# Patient Record
Sex: MALE | Race: WHITE | Employment: OTHER | URBAN - METROPOLITAN AREA
[De-identification: names, ages, dates, MRNs, and addresses within clinical notes are randomized per-mention and may not be internally consistent; named-entity substitution may affect disease eponyms.]

---

## 2017-03-22 DIAGNOSIS — I10 ESSENTIAL HYPERTENSION: ICD-10-CM

## 2017-03-22 RX ORDER — VALSARTAN AND HYDROCHLOROTHIAZIDE 320; 25 MG/1; MG/1
TABLET, FILM COATED ORAL
Qty: 90 TAB | Refills: 0 | Status: SHIPPED | OUTPATIENT
Start: 2017-03-22 | End: 2017-08-24 | Stop reason: SDUPTHER

## 2017-04-17 ENCOUNTER — HOSPITAL ENCOUNTER (OUTPATIENT)
Dept: LAB | Age: 67
Discharge: HOME | End: 2017-04-17
Payer: MEDICARE

## 2017-04-17 ENCOUNTER — OFFICE VISIT (OUTPATIENT)
Dept: INTERNAL MEDICINE CLINIC | Age: 67
End: 2017-04-17

## 2017-04-17 VITALS
DIASTOLIC BLOOD PRESSURE: 80 MMHG | BODY MASS INDEX: 34.08 KG/M2 | OXYGEN SATURATION: 94 % | RESPIRATION RATE: 16 BRPM | HEART RATE: 55 BPM | TEMPERATURE: 98.1 F | SYSTOLIC BLOOD PRESSURE: 140 MMHG | HEIGHT: 74 IN | WEIGHT: 265.6 LBS

## 2017-04-17 DIAGNOSIS — I10 ESSENTIAL HYPERTENSION: ICD-10-CM

## 2017-04-17 DIAGNOSIS — M17.11 PRIMARY OSTEOARTHRITIS OF RIGHT KNEE: ICD-10-CM

## 2017-04-17 DIAGNOSIS — N52.9 ERECTILE DYSFUNCTION, UNSPECIFIED ERECTILE DYSFUNCTION TYPE: ICD-10-CM

## 2017-04-17 DIAGNOSIS — Z23 NEED FOR 23-POLYVALENT PNEUMOCOCCAL POLYSACCHARIDE VACCINE: ICD-10-CM

## 2017-04-17 DIAGNOSIS — Z13.39 SCREENING FOR ALCOHOLISM: ICD-10-CM

## 2017-04-17 DIAGNOSIS — Z00.00 MEDICARE ANNUAL WELLNESS VISIT, SUBSEQUENT: ICD-10-CM

## 2017-04-17 DIAGNOSIS — Z00.00 MEDICARE ANNUAL WELLNESS VISIT, SUBSEQUENT: Primary | ICD-10-CM

## 2017-04-17 DIAGNOSIS — Z00.00 ROUTINE GENERAL MEDICAL EXAMINATION AT A HEALTH CARE FACILITY: ICD-10-CM

## 2017-04-17 DIAGNOSIS — E78.5 DYSLIPIDEMIA: ICD-10-CM

## 2017-04-17 DIAGNOSIS — E66.01 MORBID OBESITY DUE TO EXCESS CALORIES (HCC): ICD-10-CM

## 2017-04-17 PROCEDURE — 80053 COMPREHEN METABOLIC PANEL: CPT

## 2017-04-17 PROCEDURE — 84443 ASSAY THYROID STIM HORMONE: CPT

## 2017-04-17 PROCEDURE — 80061 LIPID PANEL: CPT

## 2017-04-17 PROCEDURE — 85025 COMPLETE CBC W/AUTO DIFF WBC: CPT

## 2017-04-17 PROCEDURE — 84153 ASSAY OF PSA TOTAL: CPT

## 2017-04-17 PROCEDURE — 36415 COLL VENOUS BLD VENIPUNCTURE: CPT

## 2017-04-17 RX ORDER — SILDENAFIL 100 MG/1
100 TABLET, FILM COATED ORAL AS NEEDED
Qty: 6 TAB | Refills: 11 | Status: SHIPPED | OUTPATIENT
Start: 2017-04-17 | End: 2017-05-22 | Stop reason: SDUPTHER

## 2017-04-17 NOTE — MR AVS SNAPSHOT
Visit Information Date & Time Provider Department Dept. Phone Encounter #  
 4/17/2017  8:30 AM Fozia Hare MD [facility] [phone] [id number] Follow-up Instructions Return in about 1 year (around 4/17/2018). Upcoming Health Maintenance Date Due Hepatitis C Screening 1950 FOBT Q 1 YEAR AGE 50-75 8/15/2000 GLAUCOMA SCREENING Q2Y 8/15/2015 Pneumococcal 65+ High/Highest Risk (2 of 2 - PPSV23) 6/9/2016 MEDICARE YEARLY EXAM 4/6/2017 DTaP/Tdap/Td series (2 - Td) 4/14/2026 Allergies as of 4/17/2017  Review Complete On: 4/17/2017 By: Myla Jacques No Known Allergies Current Immunizations  Reviewed on 10/14/2016 Name Date Influenza High Dose Vaccine PF 10/11/2016 Pneumococcal Conjugate (PCV-13) 4/14/2016 Tdap 4/14/2016 Zoster Vaccine, Live 6/1/2014 Not reviewed this visit You Were Diagnosed With   
  
 Codes Comments Medicare annual wellness visit, subsequent    -  Primary ICD-10-CM: Z00.00 ICD-9-CM: V70.0 Essential hypertension     ICD-10-CM: I10 
ICD-9-CM: 401.9 Morbid obesity due to excess calories (HCC)     ICD-10-CM: E66.01 
ICD-9-CM: 278.01 Dyslipidemia     ICD-10-CM: E78.5 ICD-9-CM: 272.4 Need for 23-polyvalent pneumococcal polysaccharide vaccine     ICD-10-CM: K86 ICD-9-CM: V03.82 Post-procedural erectile dysfunction, unspecified type     ICD-10-CM: N52.39 ICD-9-CM: 607.84 Vitals BP Pulse Temp Resp Height(growth percentile) Weight(growth percentile) 142/87 (BP 1 Location: Left arm, BP Patient Position: At rest) (!) 55 98.1 °F (36.7 °C) (Oral) 16 6' 2\" (1.88 m) 265 lb 9.6 oz (120.5 kg) SpO2 BMI Smoking Status 94% 34.1 kg/m2 Former Smoker BMI and BSA Data Body Mass Index Body Surface Area  
 34.1 kg/m 2 2.51 m 2 Preferred Pharmacy Pharmacy Name Phone [facility], [address] [phone] Your Updated Medication List  
  
   
This list is accurate as of: 4/17/17  8:58 AM.  Always use your most recent med list.  
  
  
  
  
 ibuprofen 200 mg tablet Commonly known as:  MOTRIN Take  by mouth. Omega-3-DHA-EPA-Fish Oil 1,000 mg (120 mg-180 mg) Cap Take 1 Cap by mouth daily. sildenafil citrate 100 mg tablet Commonly known as:  VIAGRA Take 1 Tab by mouth as needed. valsartan-hydroCHLOROthiazide 320-25 mg per tablet Commonly known as:  DIOVAN-HCT  
TAKE 1 TABLET BY MOUTH DAILY Prescriptions Sent to Pharmacy Refills  
 sildenafil citrate (VIAGRA) 100 mg tablet 11 Sig: Take 1 Tab by mouth as needed. Class: Normal  
 Pharmacy: [facility], [address] #: [phone] Route: Oral  
  
We Performed the Following CBC WITH AUTOMATED DIFF [63166 CPT(R)] LIPID PANEL [80582 CPT(R)] METABOLIC PANEL, COMPREHENSIVE [82537 CPT(R)] PSA W/ REFLX FREE PSA [51196 CPT(R)] Follow-up Instructions Return in about 1 year (around 4/17/2018). To-Do List   
 04/17/2017 Lab:  TSH 3RD GENERATION [facility]! Dear Rica Vee: Thank you for requesting a [employer] account. Our records indicate that you already have an active [employer] account. You can access your account anytime at [email or web address] Did you know that you can access your hospital and ER discharge instructions at any time in [employer]? You can also review all of your test results from your hospital stay or ER visit. Additional Information If you have questions, please visit the Frequently Asked Questions section of the [employer] website at [email or web address] Remember, [employer] is NOT to be used for urgent needs.  For medical emergencies, dial 911. Now available from your iPhone and Android! Please provide this summary of care documentation to your next provider. Your primary care clinician is listed as [id number] [address] [place] [address] [address]. If you have any questions after today's visit, please call [phone].

## 2017-04-17 NOTE — PROGRESS NOTES
This is a Subsequent Medicare Annual Wellness Visit providing Personalized Prevention Plan Services (PPPS) (Performed 12 months after initial AWV and PPPS )    I have reviewed the patient's medical history in detail and updated the computerized patient record. History   Here for mwv. He is in good health . He has controlled htn. He has djd of his knee but is active. He is a skier. He has some mild ed now. He needs vaccines and labs. He is up to date with c-scopes. He has had no luck dieting for weight loss. Past Medical History:   Diagnosis Date    Aortic valve sclerosis     HLD (hyperlipidemia)     Hypertension     Kidney stone     Obesity       Past Surgical History:   Procedure Laterality Date    HX COLONOSCOPY  2014    HX LITHOTRIPSY      HX PROSTATECTOMY      HX TONSILLECTOMY       Current Outpatient Prescriptions   Medication Sig Dispense Refill    sildenafil citrate (VIAGRA) 100 mg tablet Take 1 Tab by mouth as needed. 6 Tab 11    valsartan-hydroCHLOROthiazide (DIOVAN-HCT) 320-25 mg per tablet TAKE 1 TABLET BY MOUTH DAILY 90 Tab 0    ibuprofen (MOTRIN) 200 mg tablet Take  by mouth.  Omega-3-DHA-EPA-Fish Oil 1,000 (120-180) mg Cap Take 1 Cap by mouth daily.          No Known Allergies  Family History   Problem Relation Age of Onset    Hypertension Mother     Stroke Mother     Cancer Mother      breast    Hypertension Father     Heart Disease Father      valvular     Social History   Substance Use Topics    Smoking status: Former Smoker    Smokeless tobacco: Never Used    Alcohol use 1.0 oz/week     2 Cans of beer per week      Comment: 2 beers a couple nights a week     Patient Active Problem List   Diagnosis Code    Pneumonia, organism unspecified J18.9    Fever, unspecified R50.9    Arthritis M19.90    Syncope and collapse R55    Leukopenia D72.819    ARF (acute renal failure) ([facility] [address] [facility] N17.9    Obesity E66.9    Aortic valve sclerosis I35.8    Dyslipidemia E78.5    Fatigue due to excessive exertion T73. 3XXA    Midline low back pain without sciatica M54.5    Primary osteoarthritis of right knee M17.11    Essential hypertension I10       Depression Risk Factor Screening:     PHQ 2 / 9, over the last two weeks 4/17/2017   Little interest or pleasure in doing things Not at all   Feeling down, depressed or hopeless Not at all   Total Score PHQ 2 0     Alcohol Risk Factor Screening: On any occasion during the past 3 months, have you had more than 4 drinks containing alcohol? No    Do you average more than 14 drinks per week? No      Functional Ability and Level of Safety:     Hearing Loss   none    Activities of Daily Living   Self-care. Requires assistance with: no ADLs    Fall Risk     Fall Risk Assessment, last 12 mths 4/17/2017   Able to walk? Yes   Fall in past 12 months? No     Abuse Screen   Patient is not abused    Review of Systems   A comprehensive review of systems was negative except for that written in the HPI.     Physical Examination     Evaluation of Cognitive Function:  Mood/affect:  happy  Appearance: age appropriate  Family member/caregiver input: none    Visit Vitals    /80 (BP 1 Location: Left arm, BP Patient Position: At rest)    Pulse (!) 55    Temp 98.1 °F (36.7 °C) (Oral)    Resp 16    Ht 6' 2\" (1.88 m)    Wt 265 lb 9.6 oz (120.5 kg)    SpO2 94%    BMI 34.1 kg/m2     General appearance: alert, cooperative, no distress, appears stated age  Lungs: clear to auscultation bilaterally  Heart: regular rate and rhythm, S1, S2 normal, no murmur, click, rub or gallop    Patient Care Team:  Rodrick Hooker MD as PCP - General (Internal Medicine)  Valentine Gary MD as Physician (Cardiology)  María Odonnell MD as Physician (Sleep Medicine)    Advice/Referrals/Counseling   Education and counseling provided:  Are appropriate based on today's review and evaluation      Assessment/Plan   Сергей Vallejo was seen today for complete physical.    Diagnoses and all orders for this visit:    Medicare annual wellness visit, subsequent  -     CBC WITH AUTOMATED DIFF  -     PSA W/ REFLX FREE PSA  -     METABOLIC PANEL, COMPREHENSIVE  -     LIPID PANEL  -     TSH 3RD GENERATION; Future    Essential hypertension  The current medical regimen is effective;  continue present plan and medications. Morbid obesity due to excess calories ([address]    Dyslipidemia    Need for 23-polyvalent pneumococcal polysaccharide vaccine  Rx given. Primary osteoarthritis of right knee    Erectile dysfunction, unspecified type  -     sildenafil citrate (VIAGRA) 100 mg tablet; Take 1 Tab by mouth as needed. Tammy Rosenthal

## 2017-04-17 NOTE — PROGRESS NOTES
1. Have you been to the ER, urgent care clinic since your last visit? Hospitalized since your last visit?no    2. Have you seen or consulted any other health care providers outside of the [address] [place] [address] since your last visit? Include any pap smears or colon screening.  No  Chief Complaint   Patient presents with    Complete Physical     Fasting

## 2017-04-18 LAB
ALBUMIN SERPL-MCNC: 4.4 G/DL (ref 3.6–4.8)
ALBUMIN/GLOB SERPL: 1.3 {RATIO} (ref 1.2–2.2)
ALP SERPL-CCNC: 68 IU/L (ref 39–117)
ALT SERPL-CCNC: 20 IU/L (ref 0–44)
AST SERPL-CCNC: 27 IU/L (ref 0–40)
BASOPHILS # BLD AUTO: 0 X10E3/UL (ref 0–0.2)
BASOPHILS NFR BLD AUTO: 0 %
BILIRUB SERPL-MCNC: 0.7 MG/DL (ref 0–1.2)
BUN SERPL-MCNC: 23 MG/DL (ref 8–27)
BUN/CREAT SERPL: 20 (ref 10–24)
CALCIUM SERPL-MCNC: 9.5 MG/DL (ref 8.6–10.2)
CHLORIDE SERPL-SCNC: 100 MMOL/L (ref 96–106)
CHOLEST SERPL-MCNC: 199 MG/DL (ref 100–199)
CO2 SERPL-SCNC: 26 MMOL/L (ref 18–29)
CREAT SERPL-MCNC: 1.13 MG/DL (ref 0.76–1.27)
EOSINOPHIL # BLD AUTO: 0.2 X10E3/UL (ref 0–0.4)
EOSINOPHIL NFR BLD AUTO: 4 %
ERYTHROCYTE [DISTWIDTH] IN BLOOD BY AUTOMATED COUNT: 13.2 % (ref 12.3–15.4)
GLOBULIN SER CALC-MCNC: 3.5 G/DL (ref 1.5–4.5)
GLUCOSE SERPL-MCNC: 99 MG/DL (ref 65–99)
HCT VFR BLD AUTO: 45.1 % (ref 37.5–51)
HDLC SERPL-MCNC: 47 MG/DL
HGB BLD-MCNC: 15.3 G/DL (ref 12.6–17.7)
IMM GRANULOCYTES # BLD: 0 X10E3/UL (ref 0–0.1)
IMM GRANULOCYTES NFR BLD: 0 %
INTERPRETATION, 910389: NORMAL
LDLC SERPL CALC-MCNC: 121 MG/DL (ref 0–99)
LYMPHOCYTES # BLD AUTO: 2 X10E3/UL (ref 0.7–3.1)
LYMPHOCYTES NFR BLD AUTO: 38 %
MCH RBC QN AUTO: 32.4 PG (ref 26.6–33)
MCHC RBC AUTO-ENTMCNC: 33.9 G/DL (ref 31.5–35.7)
MCV RBC AUTO: 96 FL (ref 79–97)
MONOCYTES # BLD AUTO: 0.5 X10E3/UL (ref 0.1–0.9)
MONOCYTES NFR BLD AUTO: 10 %
NEUTROPHILS # BLD AUTO: 2.6 X10E3/UL (ref 1.4–7)
NEUTROPHILS NFR BLD AUTO: 48 %
PLATELET # BLD AUTO: 237 X10E3/UL (ref 150–379)
POTASSIUM SERPL-SCNC: 4.6 MMOL/L (ref 3.5–5.2)
PROT SERPL-MCNC: 7.9 G/DL (ref 6–8.5)
PSA SERPL-MCNC: 1.3 NG/ML (ref 0–4)
RBC # BLD AUTO: 4.72 X10E6/UL (ref 4.14–5.8)
REFLEX CRITERIA: NORMAL
SODIUM SERPL-SCNC: 142 MMOL/L (ref 134–144)
TRIGL SERPL-MCNC: 153 MG/DL (ref 0–149)
TSH SERPL DL<=0.005 MIU/L-ACNC: 1.76 UIU/ML (ref 0.45–4.5)
VLDLC SERPL CALC-MCNC: 31 MG/DL (ref 5–40)
WBC # BLD AUTO: 5.4 X10E3/UL (ref 3.4–10.8)

## 2017-05-22 ENCOUNTER — PATIENT MESSAGE (OUTPATIENT)
Dept: INTERNAL MEDICINE CLINIC | Age: 67
End: 2017-05-22

## 2017-05-22 DIAGNOSIS — N52.9 ERECTILE DYSFUNCTION, UNSPECIFIED ERECTILE DYSFUNCTION TYPE: ICD-10-CM

## 2017-05-22 RX ORDER — SILDENAFIL 100 MG/1
100 TABLET, FILM COATED ORAL AS NEEDED
Qty: 6 TAB | Refills: 11 | Status: SHIPPED | COMMUNITY
Start: 2017-05-22 | End: 2017-05-30 | Stop reason: SDUPTHER

## 2017-05-30 ENCOUNTER — TELEPHONE (OUTPATIENT)
Dept: INTERNAL MEDICINE CLINIC | Age: 67
End: 2017-05-30

## 2017-05-30 DIAGNOSIS — N52.9 ERECTILE DYSFUNCTION, UNSPECIFIED ERECTILE DYSFUNCTION TYPE: ICD-10-CM

## 2017-05-30 RX ORDER — SILDENAFIL 100 MG/1
100 TABLET, FILM COATED ORAL AS NEEDED
Qty: 90 TAB | Refills: 3 | Status: SHIPPED | OUTPATIENT
Start: 2017-05-30 | End: 2018-07-02 | Stop reason: SDUPTHER

## 2017-08-24 DIAGNOSIS — I10 ESSENTIAL HYPERTENSION: ICD-10-CM

## 2017-08-24 RX ORDER — VALSARTAN AND HYDROCHLOROTHIAZIDE 320; 25 MG/1; MG/1
TABLET, FILM COATED ORAL
Qty: 90 TAB | Refills: 0 | Status: SHIPPED | OUTPATIENT
Start: 2017-08-24 | End: 2017-12-08 | Stop reason: SDUPTHER

## 2017-12-08 DIAGNOSIS — I10 ESSENTIAL HYPERTENSION: ICD-10-CM

## 2017-12-08 RX ORDER — VALSARTAN AND HYDROCHLOROTHIAZIDE 320; 25 MG/1; MG/1
TABLET, FILM COATED ORAL
Qty: 90 TAB | Refills: 0 | Status: SHIPPED | OUTPATIENT
Start: 2017-12-08 | End: 2018-04-09 | Stop reason: SDUPTHER

## 2018-04-09 DIAGNOSIS — I10 ESSENTIAL HYPERTENSION: ICD-10-CM

## 2018-04-09 RX ORDER — VALSARTAN AND HYDROCHLOROTHIAZIDE 320; 25 MG/1; MG/1
TABLET, FILM COATED ORAL
Qty: 90 TAB | Refills: 0 | Status: SHIPPED | OUTPATIENT
Start: 2018-04-09 | End: 2018-07-02 | Stop reason: SDUPTHER

## 2018-04-09 NOTE — TELEPHONE ENCOUNTER
Pt has advised that he requested a refill on \" valsartan\" and it has been denied stating that the pt would need to be seen. Pt has changed to a new PCP out side of the practice. Pt advised he is out of town, and has about 7 days left of the medication.  Pt wants to have it refilled without an appointment     Best contact for the pt is [phone]              From answering service

## 2018-07-02 ENCOUNTER — OFFICE VISIT (OUTPATIENT)
Dept: INTERNAL MEDICINE CLINIC | Age: 68
End: 2018-07-02

## 2018-07-02 ENCOUNTER — HOSPITAL ENCOUNTER (OUTPATIENT)
Dept: GENERAL RADIOLOGY | Age: 68
Discharge: HOME | End: 2018-07-02
Attending: INTERNAL MEDICINE
Payer: MEDICARE

## 2018-07-02 ENCOUNTER — HOSPITAL ENCOUNTER (OUTPATIENT)
Dept: LAB | Age: 68
Discharge: HOME | End: 2018-07-02
Payer: MEDICARE

## 2018-07-02 VITALS
HEART RATE: 72 BPM | DIASTOLIC BLOOD PRESSURE: 92 MMHG | HEIGHT: 74 IN | BODY MASS INDEX: 32.6 KG/M2 | TEMPERATURE: 98.6 F | SYSTOLIC BLOOD PRESSURE: 142 MMHG | RESPIRATION RATE: 18 BRPM | WEIGHT: 254 LBS | OXYGEN SATURATION: 96 %

## 2018-07-02 DIAGNOSIS — R07.89 ATYPICAL CHEST PAIN: ICD-10-CM

## 2018-07-02 DIAGNOSIS — M79.671 CHRONIC HEEL PAIN, RIGHT: ICD-10-CM

## 2018-07-02 DIAGNOSIS — G89.29 CHRONIC HEEL PAIN, RIGHT: ICD-10-CM

## 2018-07-02 DIAGNOSIS — K06.8 BLEEDING GUMS: ICD-10-CM

## 2018-07-02 DIAGNOSIS — Z87.898 HISTORY OF SYNCOPE: ICD-10-CM

## 2018-07-02 DIAGNOSIS — I10 ESSENTIAL HYPERTENSION: Primary | ICD-10-CM

## 2018-07-02 DIAGNOSIS — C44.719 BASAL CELL CARCINOMA (BCC) OF SKIN OF LEFT LOWER EXTREMITY INCLUDING HIP: ICD-10-CM

## 2018-07-02 DIAGNOSIS — Z12.5 SCREENING FOR PROSTATE CANCER: ICD-10-CM

## 2018-07-02 DIAGNOSIS — N52.9 ERECTILE DYSFUNCTION, UNSPECIFIED ERECTILE DYSFUNCTION TYPE: ICD-10-CM

## 2018-07-02 PROBLEM — C44.91 BASAL CELL CARCINOMA OF SKIN: Status: RESOLVED | Noted: 2018-01-01 | Resolved: 2018-07-02

## 2018-07-02 PROBLEM — C44.91 BASAL CELL CARCINOMA OF SKIN: Status: ACTIVE | Noted: 2018-01-01

## 2018-07-02 PROCEDURE — 84153 ASSAY OF PSA TOTAL: CPT

## 2018-07-02 PROCEDURE — 85025 COMPLETE CBC W/AUTO DIFF WBC: CPT

## 2018-07-02 PROCEDURE — 71046 X-RAY EXAM CHEST 2 VIEWS: CPT

## 2018-07-02 PROCEDURE — 85670 THROMBIN TIME PLASMA: CPT

## 2018-07-02 PROCEDURE — 80061 LIPID PANEL: CPT

## 2018-07-02 PROCEDURE — 80053 COMPREHEN METABOLIC PANEL: CPT

## 2018-07-02 RX ORDER — VALSARTAN AND HYDROCHLOROTHIAZIDE 320; 25 MG/1; MG/1
TABLET, FILM COATED ORAL
Qty: 90 TAB | Refills: 1 | Status: SHIPPED | COMMUNITY
Start: 2018-07-02 | End: 2018-10-25 | Stop reason: ALTCHOICE

## 2018-07-02 RX ORDER — SILDENAFIL 100 MG/1
100 TABLET, FILM COATED ORAL AS NEEDED
Qty: 24 TAB | Refills: 3 | Status: SHIPPED | COMMUNITY
Start: 2018-07-02 | End: 2020-03-18 | Stop reason: SDUPTHER

## 2018-07-02 NOTE — MR AVS SNAPSHOT
[facility] 
 
 
 [address] [facility] [address] 
[phone] Patient: Nicole Mina MRN: [id number] JPM:[birth year] Visit Information Date & Time Provider Department Dept. Phone Encounter #  
 7/2/2018 11:00 AM Stefania López MD [facility] [address] [id number] Upcoming Health Maintenance Date Due Hepatitis C Screening 1950 FOBT Q 1 YEAR AGE 50-75 8/15/2000 GLAUCOMA SCREENING Q2Y 8/15/2015 Pneumococcal 65+ High/Highest Risk (2 of 2 - PPSV23) 6/9/2016 MEDICARE YEARLY EXAM 4/18/2018 Influenza Age 5 to Adult 8/1/2018 DTaP/Tdap/Td series (2 - Td) 4/14/2026 Allergies as of 7/2/2018  Review Complete On: 7/2/2018 By: Stefania López MD  
 No Known Allergies Current Immunizations  Reviewed on 7/2/2018 Name Date Influenza High Dose Vaccine PF 10/11/2016 Pneumococcal Conjugate (PCV-13) 4/14/2016 Tdap 4/14/2016 Zoster Vaccine, Live 6/1/2014 Reviewed by Stefania López MD on 7/2/2018 at 11:53 AM  
You Were Diagnosed With   
  
 Codes Comments Essential hypertension    -  Primary ICD-10-CM: I10 
ICD-9-CM: 401.9 Atypical chest pain     ICD-10-CM: R07.89 ICD-9-CM: 786.59 History of syncope     ICD-10-CM: Z87.898 ICD-9-CM: V15.89 Bleeding gums     ICD-10-CM: K06.8 ICD-9-CM: 523.8 Erectile dysfunction, unspecified erectile dysfunction type     ICD-10-CM: N52.9 ICD-9-CM: 607.84 Chronic heel pain, right     ICD-10-CM: M79.671, G89.29 ICD-9-CM: 729.5, 338.29 Basal cell carcinoma (BCC) of skin of left lower extremity including hip     ICD-10-CM: C44.719 ICD-9-CM: 173.71 Screening for prostate cancer     ICD-10-CM: Z12.5 ICD-9-CM: V76.44 Vitals BP Pulse Temp Resp Height(growth percentile) Weight(growth percentile) (!) 142/92 (BP 1 Location: Left arm, BP Patient Position: Sitting) 72 98.6 °F (37 °C) (Oral) 18 6' 2\" (1.88 m) 254 lb (115.2 kg) SpO2 BMI Smoking Status 96% 32.61 kg/m2 Former Smoker Vitals History BMI and BSA Data Body Mass Index Body Surface Area  
 32.61 kg/m 2 2.45 m 2 Preferred Pharmacy Pharmacy Name Phone [facility], [facility] [phone] Your Updated Medication List  
  
   
This list is accurate as of 7/2/18 12:06 PM.  Always use your most recent med list.  
  
  
  
  
 ibuprofen 200 mg tablet Commonly known as:  MOTRIN Take 200 mg by mouth daily. sildenafil citrate 100 mg tablet Commonly known as:  VIAGRA Take 1 Tab by mouth as needed. valsartan-hydroCHLOROthiazide 320-25 mg per tablet Commonly known as:  DIOVAN-HCT  
TAKE 1 TABLET BY MOUTH DAILY Prescriptions Sent to Mail Order Refills  
 valsartan-hydroCHLOROthiazide (DIOVAN-HCT) 320-25 mg per tablet 1 Sig: TAKE 1 TABLET BY MOUTH DAILY Class: Mail Order Pharmacy: [address] [place] [address] [employer] [employer] Ph #: [phone]  
 sildenafil citrate (VIAGRA) 100 mg tablet 3 Sig: Take 1 Tab by mouth as needed. Class: Mail Order Pharmacy: [address] [place] [address], [facility]  Ph #: [phone] Route: Oral  
  
We Performed the Following BLEEDING PROFILE [id number] CPT(R)] CBC WITH AUTOMATED DIFF [14055 CPT(R)] LIPID PANEL [75470 CPT(R)] METABOLIC PANEL, COMPREHENSIVE [48931 CPT(R)] PSA W/ REFLX FREE PSA [23069 CPT(R)] REFERRAL TO PODIATRY [REF90 Custom] To-Do List   
 07/02/2018 Imaging:  XR CHEST PA LAT Referral Information Referral ID Referred By Referred To  
  
 [id number] Cl HAY DPM   
   [address]   
   [place], [address] Phone: [phone] Fax: [phone] Visits Status Start Date End Date 1 New Request 7/2/18 7/2/19 If your referral has a status of pending review or denied, additional information will be sent to support the outcome of this decision. Introducing [facility]! Dear Cassandra Credit: Thank you for requesting a [employer] account. Our records indicate that you already have an active [employer] account. You can access your account anytime at [email or web address] Did you know that you can access your hospital and ER discharge instructions at any time in [employer]? You can also review all of your test results from your hospital stay or ER visit. Additional Information If you have questions, please visit the Frequently Asked Questions section of the [employer] website at [email or web address] Remember, [employer] is NOT to be used for urgent needs. For medical emergencies, dial 911. Now available from your iPhone and Android! Please provide this summary of care documentation to your next provider. Your primary care clinician is listed as Joao Moran. If you have any questions after today's visit, please call [phone].

## 2018-07-02 NOTE — PROGRESS NOTES
HISTORY OF PRESENT ILLNESS    New patient to my practice, referred to me by self. Prior medical care has been from Dr. Katelyn Lutz. he works as a Retired [employer]. .  his  past medical history was reviewed, discussed, and summarized in the list below. He is a traveler to [facility] every year, going to [facility] in 9/2017. Reports bleeding gums excessively during dental work for a few years. Does not have bleeding above normal with cuts. No bruises or hematomas. Takes motrin bid most days. No ASA. Dentist referred to oral surgeon Dr. Joseph Harmon and did not find anything. Reports left upper chest wall pain for 3 months. Onset was while sneezing. Feels occasional pains. .  No pain with exertion. Noticed a few seconds of pain. Not reproducible. Sometimes occur when reaching for lamp. Sometimes occurs with exertion. Hx of stress echo 2016 normal.  Mild aortic sclerosis 2015    Recurrent right posterior heel pain. Recurs yearly  Can not stand on foot well when this occurs. Taking diclofenac. Last episode was 6 months ago. Hypertension  Hypertension ROS: taking medications as instructed, no medication side effects noted, no TIA's, no chest pain on exertion, no dyspnea on exertion, no swelling of ankles     reports that he has quit smoking. He has never used smokeless tobacco.    reports that he drinks about 1.0 oz of alcohol per week    BP Readings from Last 2 Encounters:   07/02/18 (!) 142/92   04/17/17 140/80         Review of Systems   All other systems reviewed and are negative, except as noted in HPI      Past Medical and Surgical History  Past Medical History:   Diagnosis Date    Aortic valve sclerosis 2015    no stenosis.   normal stress echo 9/2016    ARF (acute renal failure) ([address] 9/23/2012    Bleeding gums 7/2/2018    HLD (hyperlipidemia)     Hypertension     Kidney stone     Normal cardiac stress test 09/14/2016    Obesity     TOMY (obstructive sleep apnea) 09/2016    mild TOMY    Pneumonia 09/2012    Syncope and collapse 9/21/2012        has a past surgical history that includes hx lithotripsy; hx tonsillectomy; hx prostatectomy; and hx colonoscopy (2014). Current Outpatient Prescriptions   Medication Sig    valsartan-hydroCHLOROthiazide (DIOVAN-HCT) 320-25 mg per tablet TAKE 1 TABLET BY MOUTH DAILY    sildenafil citrate (VIAGRA) 100 mg tablet Take 1 Tab by mouth as needed.  ibuprofen (MOTRIN) 200 mg tablet Take 200 mg by mouth daily. No current facility-administered medications for this visit. reports that he has quit smoking. He has never used smokeless tobacco. He reports that he drinks about 1.0 oz of alcohol per week  He reports that he does not use illicit drugs. family history includes Cancer in his mother; Heart Disease in his father; Hypertension in his father and mother; Stroke in his mother. Physical Exam   Nursing note and vitals reviewed. Blood pressure (!) 142/92, pulse 72, temperature 98.6 °F (37 °C), temperature source Oral, resp. rate 18, height 6' 2\" (1.88 m), weight 254 lb (115.2 kg), SpO2 96 %. Constitutional: oriented to person, place, and time. No distress. Eyes: Conjunctivae are normal.   HEENT:  No cervical lymphadenopathy. No thyroid nodules or goiter  Cardiovascular: Normal rate. Regular rhythm, he does have a history of aortic sclerosis but no stenosis. Murmur seems relatively benign on my exam today. Consider repeat echo. 3 out of 6 murmur. No edema  Pulmonary/Chest: Effort normal. clear to auscultation  Abdominal: soft, non-tender, non-distended  Musculoskeletal:    Right proximal heel with mild source of his Achilles. No ecchymosis. Neurological: Alert and oriented to person, place. Cranial nerves grossly intact. Normal gait   Skin: No rash noted. Psychiatric: Normal mood and affect. Behavior is normal.     ASSESSMENT and PLAN  Diagnoses and all orders for this visit:    1.  Essential hypertension-  his blood pressure is borderline controlled today. We will monitor for now. Check home blood pressures. Suspect some white coat effects.   -     valsartan-hydroCHLOROthiazide (DIOVAN-HCT) 320-25 mg per tablet; TAKE 1 TABLET BY MOUTH DAILY  -     LIPID PANEL  -     METABOLIC PANEL, COMPREHENSIVE    2. Atypical chest pain  Sounds like musculoskeletal pain. Distant history of smoking. Nothing about his history sounds cardiac at this point, but will reconsider if symptoms change. -     XR CHEST PA LAT; Future    3. History of syncope  2 episodes of syncope. Likely both related to dehydration. Last episode was a couple of months ago. History of aortic sclerosis. Consider repeat echocardiogram.  Last in 2016. Did not have any stenosis at that time. 4. Bleeding gums  Suspect this is due to poor gingival health. Recommend holding NSAIDs 1 week prior to any dental procedures as well. Check labs as below. I can refer him to a hematologist, but he has no other bleeding with other procedures or bleeding when brushing his teeth. -     CBC WITH AUTOMATED DIFF  -     BLEEDING PROFILE    5. Erectile dysfunction, unspecified erectile dysfunction type  -     sildenafil citrate (VIAGRA) 100 mg tablet; Take 1 Tab by mouth as needed. 6. Chronic heel pain, right suspect his Achilles tendinitis. Will recommend gentle warmup and stretching after exercise. Refer to podiatry.   May consider DMV form for parking if symptoms persist.  -     REFERRAL TO PODIATRY    8. Screening for prostate cancer  -     PSA W/ REFLX FREE PSA    lab results and schedule of future lab studies reviewed with patient  reviewed medications and side effects in detail     return to clinic for wellness exam in the next 6 months

## 2018-07-03 LAB
ALBUMIN SERPL-MCNC: 4.3 G/DL (ref 3.6–4.8)
ALBUMIN/GLOB SERPL: 1.3 {RATIO} (ref 1.2–2.2)
ALP SERPL-CCNC: 59 IU/L (ref 39–117)
ALT SERPL-CCNC: 18 IU/L (ref 0–44)
APTT PPP: 25 SEC (ref 24–33)
AST SERPL-CCNC: 25 IU/L (ref 0–40)
BASOPHILS # BLD AUTO: 0 X10E3/UL (ref 0–0.2)
BASOPHILS NFR BLD AUTO: 0 %
BILIRUB SERPL-MCNC: 0.6 MG/DL (ref 0–1.2)
BUN SERPL-MCNC: 16 MG/DL (ref 8–27)
BUN/CREAT SERPL: 16 (ref 10–24)
CALCIUM SERPL-MCNC: 9.6 MG/DL (ref 8.6–10.2)
CHLORIDE SERPL-SCNC: 101 MMOL/L (ref 96–106)
CHOLEST SERPL-MCNC: 202 MG/DL (ref 100–199)
CO2 SERPL-SCNC: 25 MMOL/L (ref 20–29)
CREAT SERPL-MCNC: 1.02 MG/DL (ref 0.76–1.27)
EOSINOPHIL # BLD AUTO: 0.2 X10E3/UL (ref 0–0.4)
EOSINOPHIL NFR BLD AUTO: 3 %
ERYTHROCYTE [DISTWIDTH] IN BLOOD BY AUTOMATED COUNT: 13.9 % (ref 12.3–15.4)
GLOBULIN SER CALC-MCNC: 3.3 G/DL (ref 1.5–4.5)
GLUCOSE SERPL-MCNC: 97 MG/DL (ref 65–99)
HCT VFR BLD AUTO: 41.7 % (ref 37.5–51)
HDLC SERPL-MCNC: 50 MG/DL
HGB BLD-MCNC: 14.4 G/DL (ref 13–17.7)
IMM GRANULOCYTES # BLD: 0 X10E3/UL (ref 0–0.1)
IMM GRANULOCYTES NFR BLD: 0 %
INR PPP: 1 (ref 0.8–1.2)
INTERPRETATION, 910389: NORMAL
LDLC SERPL CALC-MCNC: 128 MG/DL (ref 0–99)
LYMPHOCYTES # BLD AUTO: 1.6 X10E3/UL (ref 0.7–3.1)
LYMPHOCYTES NFR BLD AUTO: 32 %
MCH RBC QN AUTO: 32.4 PG (ref 26.6–33)
MCHC RBC AUTO-ENTMCNC: 34.5 G/DL (ref 31.5–35.7)
MCV RBC AUTO: 94 FL (ref 79–97)
MONOCYTES # BLD AUTO: 0.5 X10E3/UL (ref 0.1–0.9)
MONOCYTES NFR BLD AUTO: 10 %
NEUTROPHILS # BLD AUTO: 2.7 X10E3/UL (ref 1.4–7)
NEUTROPHILS NFR BLD AUTO: 55 %
PLATELET # BLD AUTO: 239 X10E3/UL (ref 150–379)
POTASSIUM SERPL-SCNC: 3.9 MMOL/L (ref 3.5–5.2)
PROT SERPL-MCNC: 7.6 G/DL (ref 6–8.5)
PROTHROMBIN TIME: 10.7 SEC (ref 9.1–12)
PSA SERPL-MCNC: 1.3 NG/ML (ref 0–4)
RBC # BLD AUTO: 4.44 X10E6/UL (ref 4.14–5.8)
REFLEX CRITERIA: NORMAL
SODIUM SERPL-SCNC: 144 MMOL/L (ref 134–144)
THROMBIN TIME: 16.9 SEC (ref 0–23)
TRIGL SERPL-MCNC: 118 MG/DL (ref 0–149)
VLDLC SERPL CALC-MCNC: 24 MG/DL (ref 5–40)
WBC # BLD AUTO: 4.9 X10E3/UL (ref 3.4–10.8)

## 2018-10-25 ENCOUNTER — OFFICE VISIT (OUTPATIENT)
Dept: INTERNAL MEDICINE CLINIC | Age: 68
End: 2018-10-25

## 2018-10-25 VITALS
TEMPERATURE: 98 F | HEIGHT: 74 IN | HEART RATE: 59 BPM | SYSTOLIC BLOOD PRESSURE: 148 MMHG | BODY MASS INDEX: 33.47 KG/M2 | DIASTOLIC BLOOD PRESSURE: 99 MMHG | OXYGEN SATURATION: 95 % | RESPIRATION RATE: 18 BRPM | WEIGHT: 260.8 LBS

## 2018-10-25 DIAGNOSIS — G89.29 CHRONIC HEEL PAIN, RIGHT: ICD-10-CM

## 2018-10-25 DIAGNOSIS — I10 ESSENTIAL HYPERTENSION: ICD-10-CM

## 2018-10-25 DIAGNOSIS — M79.671 CHRONIC HEEL PAIN, RIGHT: ICD-10-CM

## 2018-10-25 DIAGNOSIS — Z23 ENCOUNTER FOR IMMUNIZATION: ICD-10-CM

## 2018-10-25 DIAGNOSIS — Z13.39 SCREENING FOR ALCOHOLISM: ICD-10-CM

## 2018-10-25 DIAGNOSIS — Z00.00 WELCOME TO MEDICARE PREVENTIVE VISIT: Primary | ICD-10-CM

## 2018-10-25 RX ORDER — ZOSTER VACCINE RECOMBINANT, ADJUVANTED 50 MCG/0.5
0.5 KIT INTRAMUSCULAR ONCE
Qty: 0.5 ML | Refills: 1 | Status: SHIPPED | OUTPATIENT
Start: 2018-10-25 | End: 2018-10-25

## 2018-10-25 RX ORDER — OLMESARTAN MEDOXOMIL AND HYDROCHLOROTHIAZIDE 40/25 40; 25 MG/1; MG/1
1 TABLET ORAL DAILY
Qty: 90 TAB | Refills: 1 | Status: SHIPPED | COMMUNITY
Start: 2018-10-25 | End: 2019-03-28 | Stop reason: ALTCHOICE

## 2018-10-25 RX ORDER — DICLOFENAC SODIUM 10 MG/G
GEL TOPICAL 4 TIMES DAILY
COMMUNITY
End: 2018-10-25 | Stop reason: SDUPTHER

## 2018-10-25 RX ORDER — DICLOFENAC SODIUM 10 MG/G
GEL TOPICAL 4 TIMES DAILY
Qty: 400 G | Refills: 3 | Status: SHIPPED | COMMUNITY
Start: 2018-10-25 | End: 2020-06-15

## 2018-10-25 NOTE — ACP (ADVANCE CARE PLANNING)
Advance Care Planning (ACP) Provider Note - Comprehensive     Date of ACP Conversation: 10/25/18  Persons included in Conversation:  patient  Length of ACP Conversation in minutes:  <16 minutes (Non-Billable)    Authorized Decision Maker (if patient is incapable of making informed decisions): This person is:  Healthcare Agent/Medical Power of  under Advance Directive          General ACP for ALL Patients with Decision Making Capacity:   Importance of advance care planning, including choosing a healthcare agent to communicate patient's healthcare decisions if patient lost the ability to make decisions, such as after a sudden illness or accident  Understanding of the healthcare agent role was assessed and information provided  Exploration of values, goals, and preferences if recovery is not expected, even with continued medical treatment in the event of: Imminent death  Severe, permanent brain injury    Review of Existing Advance Directive:  not availble     For Serious or Chronic Illness:  Understanding of medical condition    Understanding of CPR, goals and expected outcomes, benefits and burdens discussed. Information on CPR success rates provided (e.g. for CPR in hospital, survival to d/c at two weeks is 22%, for chronically ill or elderly/frail survival is less than 3%); Individual asked to communicate understanding of information in his/her own words.   Explored fears and concerns regarding CPR or possible outcomes    Interventions Provided:  Recommended completion of Advance Directive form after review of ACP materials and conversation with prospective healthcare agent   Recommended communicating the plan and making copies for the healthcare agent, personal physician, and others as appropriate (e.g., health system)

## 2018-10-25 NOTE — PATIENT INSTRUCTIONS
Medicare Wellness Visit, Male The best way to live healthy is to have a lifestyle where you eat a well-balanced diet, exercise regularly, limit alcohol use, and quit all forms of tobacco/nicotine, if applicable. Regular preventive services are another way to keep healthy. Preventive services (vaccines, screening tests, monitoring & exams) can help personalize your care plan, which helps you manage your own care. Screening tests can find health problems at the earliest stages, when they are easiest to treat. [facility] follows the current, evidence-based guidelines published by the [facility] ([facility]) when recommending preventive services for our patients. Because we follow these guidelines, sometimes recommendations change over time as research supports it. (For example, a prostate screening blood test is no longer routinely recommended for men with no symptoms.) Of course, you and your doctor may decide to screen more often for some diseases, based on your risk and co-morbidities (chronic disease you are already diagnosed with). Preventive services for you include: - Medicare offers their members a free annual wellness visit, which is time for you and your primary care provider to discuss and plan for your preventive service needs. Take advantage of this benefit every year! 
-All adults over age 72 should receive the recommended pneumonia vaccines. Current USPSTF guidelines recommend a series of two vaccines for the best pneumonia protection.  
-All adults should have a flu vaccine yearly and an ECG.  All adults age 61 and older should receive a shingles vaccine once in their lifetime.   
-All adults age 38-68 who are overweight should have a diabetes screening test once every three years.  
-Other screening tests & preventive services for persons with diabetes include: an eye exam to screen for diabetic retinopathy, a kidney function test, a foot exam, and stricter control over your cholesterol.  
-Cardiovascular screening for adults with routine risk involves an electrocardiogram (ECG) at intervals determined by the provider.  
-Colorectal cancer screening should be done for adults age 54-65 with no increased risk factors for colorectal cancer. There are a number of acceptable methods of screening for this type of cancer. Each test has its own benefits and drawbacks. Discuss with your provider what is most appropriate for you during your annual wellness visit. The different tests include: colonoscopy (considered the best screening method), a fecal occult blood test, a fecal DNA test, and sigmoidoscopy. 
-All adults born between [facility] should be screened once for Hepatitis C. 
-An Abdominal Aortic Aneurysm (AAA) Screening is recommended for men age 73-68 who has ever smoked in their lifetime. Here is a list of your current Health Maintenance items (your personalized list of preventive services) with a due date: There are no preventive care reminders to display for this patient.

## 2018-10-25 NOTE — PROGRESS NOTES
This is a \"Welcome to [employer]"  Initial Preventive Physical Examination (IPPE) providing Personalized Prevention Plan Services (Performed in the first 12 months of enrollment) I have reviewed the patient's medical history in detail and updated the computerized patient record. History Past Medical History:  
Diagnosis Date  Aortic valve sclerosis 2015  
 no stenosis. normal stress echo 9/2016  ARF (acute renal failure) ([address] 9/23/2012  Basal cell carcinoma of skin 2018  
 left shin.  Bleeding gums 7/2/2018  Chronic heel pain, right 07/02/2018  Glaucoma Dr. Luisa Gar  HLD (hyperlipidemia)  Hypertension  Kidney stone  Normal cardiac stress test 09/14/2016  Obesity  TOMY (obstructive sleep apnea) 09/2016  
 mild TOMY  Osteoarthritis  Osteoarthritis, hand  Pneumonia 09/2012  Syncope and collapse 09/21/2012  
 due to pneumonia,  recurred 2018 (no evaluation) Past Surgical History:  
Procedure Laterality Date  HX COLONOSCOPY  2014 Dr. Kelsi Jiménez  HX LITHOTRIPSY  HX ORTHOPAEDIC  1990  
 toe surgery  HX PROSTATECTOMY  HX TONSILLECTOMY Current Outpatient Medications Medication Sig Dispense Refill  SHINGRIX, PF, 50 mcg/0.5 mL susr injection 0.5 mL by IntraMUSCular route once for 1 dose. Receive 2nd dose in 2-6 months. For Shingles (Zoster) prevention 0.5 mL 1  diclofenac (VOLTAREN) 1 % gel Apply  to affected area four (4) times daily. 400 g 3  
 olmesartan-hydroCHLOROthiazide (BENICAR HCT) 40-25 mg per tablet Take 1 Tab by mouth daily. Replaces valsartan-HCTZ 90 Tab 1  
 sildenafil citrate (VIAGRA) 100 mg tablet Take 1 Tab by mouth as needed. 24 Tab 3  ibuprofen (MOTRIN) 200 mg tablet Take 200 mg by mouth as needed. No Known Allergies Family History Problem Relation Age of Onset  Hypertension Mother  Stroke Mother  Cancer Mother   
     breast  
 Hypertension Father  Heart Disease Father   
     valvular Social History Tobacco Use  Smoking status: Former Smoker  Smokeless tobacco: Never Used Substance Use Topics  Alcohol use: Yes Alcohol/week: 1.0 oz Types: 2 Cans of beer per week Comment: 2 beers a couple nights a week Diet, Lifestyle: No special diet Exercise level: moderately active Depression Risk Screen PHQ over the last two weeks 10/25/2018 Little interest or pleasure in doing things Not at all Feeling down, depressed, irritable, or hopeless Not at all Total Score PHQ 2 0 Alcohol Risk Screen You do not drink alcohol or very rarely. Functional Ability and Level of Safety Hearing Loss Hearing is good. Vision Screening Vision is good. No exam data present Activities of Daily Living The home contains: no safety equipment. Patient does total self care Fall Risk Screen Fall Risk Assessment, last 12 mths 10/25/2018 Able to walk? Yes Fall in past 12 months? No  
 
 
Abuse Screen Patient is not abused Screening EKG EKG order placed: Yes Patient Care Team  
Patient Care Team: 
Tony Luu MD as PCP - General (Internal Medicine) Elba Almonte MD as Physician (Cardiology) Susana Stovall MD as Physician (Sleep Medicine) End of Life Planning Advanced care planning directives were discussed with the patient and /or family/caregiver. Assessment/Plan Education and counseling provided: 
Are appropriate based on today's review and evaluation End-of-Life planning (with patient's consent) Pneumococcal Vaccine Influenza Vaccine Diagnoses and all orders for this visit: 
 
1. Welcome to Medicare preventive visit Will get is eye exam this week VEI. Get colonoscopy report Dr. Lon Brown 2014  
-     AMB POC EKG ROUTINE W/ 12 LEADS, INTER & REP 2. Encounter for immunization 
-     INFLUENZA VACCINE INACTIVATED (IIV), SUBUNIT, ADJUVANTED, IM 
-     ADMIN INFLUENZA VIRUS VAC 
 -     PNEUMOCOCCAL POLYSACCHARIDE VACCINE, 23-VALENT, ADULT OR IMMUNOSUPPRESSED PT DOSE, 3. Essential hypertension Uncontrolled Change med- can change to 2 pills prn 
-     olmesartan-hydroCHLOROthiazide (BENICAR HCT) 40-25 mg per tablet; Take 1 Tab by mouth daily. Replaces valsartan-HCTZ 4. Chronic heel pain, right 
-     diclofenac (VOLTAREN) 1 % gel; Apply  to affected area four (4) times daily. 5. Screening for alcoholism -     [place] ANNUAL ALCOHOL SCREEN 15 MIN Other orders 
-     SHINGRIX, PF, 50 mcg/0.5 mL susr injection; 0.5 mL by IntraMUSCular route once for 1 dose. Receive 2nd dose in 2-6 months. For Shingles (Zoster) prevention Discussed the patient's BMI with him. The BMI follow up plan is as follows:  
 
dietary management education, guidance, and counseling 
encourage exercise 
monitor weight 
prescribed dietary intake An After Visit Summary was printed and given to the patient. There are no preventive care reminders to display for this patient.

## 2019-03-28 ENCOUNTER — OFFICE VISIT (OUTPATIENT)
Dept: INTERNAL MEDICINE CLINIC | Age: 69
End: 2019-03-28

## 2019-03-28 VITALS
WEIGHT: 264.38 LBS | TEMPERATURE: 97.9 F | RESPIRATION RATE: 18 BRPM | OXYGEN SATURATION: 95 % | BODY MASS INDEX: 33.93 KG/M2 | DIASTOLIC BLOOD PRESSURE: 80 MMHG | SYSTOLIC BLOOD PRESSURE: 117 MMHG | HEART RATE: 58 BPM | HEIGHT: 74 IN

## 2019-03-28 DIAGNOSIS — B35.1 ONYCHOMYCOSIS: ICD-10-CM

## 2019-03-28 DIAGNOSIS — G89.29 CHRONIC PAIN OF RIGHT KNEE: ICD-10-CM

## 2019-03-28 DIAGNOSIS — I10 ESSENTIAL HYPERTENSION: Primary | ICD-10-CM

## 2019-03-28 DIAGNOSIS — M25.561 CHRONIC PAIN OF RIGHT KNEE: ICD-10-CM

## 2019-03-28 RX ORDER — AZILSARTAN KAMEDOXOMIL AND CHLORTHALIDONE 40; 25 MG/1; MG/1
1 TABLET ORAL DAILY
Qty: 90 TAB | Refills: 1 | Status: SHIPPED | COMMUNITY
Start: 2019-03-28 | End: 2019-08-28 | Stop reason: ALTCHOICE

## 2019-08-28 ENCOUNTER — HOSPITAL ENCOUNTER (OUTPATIENT)
Dept: LAB | Age: 69
Discharge: HOME | End: 2019-08-28
Payer: MEDICARE

## 2019-08-28 ENCOUNTER — OFFICE VISIT (OUTPATIENT)
Dept: INTERNAL MEDICINE CLINIC | Age: 69
End: 2019-08-28

## 2019-08-28 VITALS
OXYGEN SATURATION: 91 % | DIASTOLIC BLOOD PRESSURE: 83 MMHG | RESPIRATION RATE: 18 BRPM | WEIGHT: 268 LBS | BODY MASS INDEX: 34.39 KG/M2 | HEIGHT: 74 IN | HEART RATE: 61 BPM | SYSTOLIC BLOOD PRESSURE: 135 MMHG | TEMPERATURE: 98 F

## 2019-08-28 DIAGNOSIS — I10 ESSENTIAL HYPERTENSION: Primary | ICD-10-CM

## 2019-08-28 DIAGNOSIS — Z11.59 ENCOUNTER FOR HEPATITIS C SCREENING TEST FOR LOW RISK PATIENT: ICD-10-CM

## 2019-08-28 DIAGNOSIS — Z92.89 HISTORY OF CARDIOVASCULAR STRESS TEST: ICD-10-CM

## 2019-08-28 DIAGNOSIS — Z12.5 SCREENING FOR PROSTATE CANCER: ICD-10-CM

## 2019-08-28 DIAGNOSIS — K06.8 BLEEDING GUMS: ICD-10-CM

## 2019-08-28 DIAGNOSIS — R55 PRE-SYNCOPE: ICD-10-CM

## 2019-08-28 PROCEDURE — 85007 BL SMEAR W/DIFF WBC COUNT: CPT

## 2019-08-28 PROCEDURE — 36415 COLL VENOUS BLD VENIPUNCTURE: CPT

## 2019-08-28 PROCEDURE — 80053 COMPREHEN METABOLIC PANEL: CPT

## 2019-08-28 PROCEDURE — 80061 LIPID PANEL: CPT

## 2019-08-28 PROCEDURE — 86803 HEPATITIS C AB TEST: CPT

## 2019-08-28 RX ORDER — AZILSARTAN KAMEDOXOMIL AND CHLORTHALIDONE 40; 12.5 MG/1; MG/1
1 TABLET ORAL DAILY
Qty: 90 TAB | Refills: 1 | Status: SHIPPED | COMMUNITY
Start: 2019-08-28 | End: 2019-08-28 | Stop reason: SDUPTHER

## 2019-08-28 RX ORDER — AZILSARTAN KAMEDOXOMIL AND CHLORTHALIDONE 40; 12.5 MG/1; MG/1
1 TABLET ORAL DAILY
Qty: 90 TAB | Refills: 4 | Status: SHIPPED | COMMUNITY
Start: 2019-08-28 | End: 2020-03-18 | Stop reason: SDUPTHER

## 2019-08-28 NOTE — PROGRESS NOTES
HISTORY OF PRESENT ILLNESS    Chief Complaint   Patient presents with    Hypertension     bp has been really low about 3 times    Arthritis    Basal Cell Carcinoma       Presents for follow-up  Frustrated about a $24 bill that he thought he said  Seeing derm for hx of BCC  and screening  Eye exam pending. Hypertension- has occasional episodes of BP 70/50s after working in the yard, associated with pre-syncope. BP is usually 110-120s/60-70s  Hypertension ROS: taking medications as instructed, no medication side effects noted, no TIA's, no chest pain on exertion, no dyspnea on exertion, no swelling of ankles     reports that he has quit smoking. He has never used smokeless tobacco.    reports that he drinks about 1.7 standard drinks of alcohol per week. BP Readings from Last 2 Encounters:   08/28/19 135/83   03/28/19 117/80     Reports bleeding gums excessively during dental work for a few years. Does not have bleeding above normal with cuts. No bruises or hematomas. Takes motrin bid most days. No ASA. Dentist referred to oral surgeon Dr. Joe Hawkins and did not find anything. Review of Systems   All other systems reviewed and are negative, except as noted in HPI    Past Medical and Surgical History   has a past medical history of Aortic valve sclerosis (2015), [facility] (acute renal failure) [facility] [address] (9/23/2012), Basal cell carcinoma of skin (2018), Bleeding gums (7/2/2018), Bruised ribs (01/10/2019), Chronic heel pain, right (07/02/2018), Concussion (01/10/2019), Glaucoma, History of cardiovascular stress test (2016), HLD (hyperlipidemia), Hypertension, Kidney stone, Normal cardiac stress test (09/14/2016), Obesity, TOMY (obstructive sleep apnea) (09/2016), Osteoarthritis, Osteoarthritis, hand, Pneumonia (09/2012), and Syncope and collapse (09/21/2012). has a past surgical history that includes hx lithotripsy; hx tonsillectomy; hx prostatectomy; hx colonoscopy (2014); and hx orthopaedic (1990). reports that he has quit smoking. He has never used smokeless tobacco. He reports that he drinks about 1.7 standard drinks of alcohol per week. He reports that he does not use drugs. family history includes Cancer in his mother; Heart Disease in his father; Hypertension in his father and mother; Stroke in his mother. Physical Exam   Nursing note and vitals reviewed. Blood pressure 135/83, pulse 61, temperature 98 °F (36.7 °C), temperature source Oral, resp. rate 18, height 6' 2\" (1.88 m), weight 268 lb (121.6 kg), SpO2 91 %. Constitutional:  No distress. Eyes: Conjunctivae are normal.   Ears:  Hearing grossly intact  Cardiovascular: Normal rate. regular rhythm, no murmurs or gallops  No edema  Pulmonary/Chest: Effort normal.   CTAB  Musculoskeletal: moves all 4 extremities   Neurological: Alert and oriented to person, place, and time. Skin: No rash noted. Psychiatric: Normal mood and affect. Behavior is normal.     ASSESSMENT and PLAN  Diagnoses and all orders for this visit:    1. Essential hypertension- over-controlled at home w pre-syncope when exercising. Decrease dose. -     METABOLIC PANEL, COMPREHENSIVE  -     LIPID PANEL  -     EDARBYCLOR 40-12.5 mg tab; Take 1 Tab by mouth daily. 2. Pre-syncope- BP is over-controlled  Hydrate before exercise!!!!    3. Bleeding gums- chronic. No other bleeding. Dentist and periodontist could not explain. He is concerned. Recommend heme consult.  -     REFERRAL TO HEMATOLOGY  -     CBC+PLATELET+HEM REVIEW    4. Encounter for hepatitis C screening test for low risk patient  -     HCV AB W/RFLX TO LELA    5. History of cardiovascular stress test  Normal in 2016      Patient Instructions          Body Mass Index: Care Instructions  Your Care Instructions    Body mass index (BMI) can help you see if your weight is raising your risk for health problems. It uses a formula to compare how much you weigh with how tall you are.   · A BMI lower than 18.5 is considered underweight. · A BMI between 18.5 and 24.9 is considered healthy. · A BMI between 25 and 29.9 is considered overweight. A BMI of 30 or higher is considered obese. If your BMI is in the normal range, it means that you have a lower risk for weight-related health problems. If your BMI is in the overweight or obese range, you may be at increased risk for weight-related health problems, such as high blood pressure, heart disease, stroke, arthritis or joint pain, and diabetes. If your BMI is in the underweight range, you may be at increased risk for health problems such as fatigue, lower protection (immunity) against illness, muscle loss, bone loss, hair loss, and hormone problems. BMI is just one measure of your risk for weight-related health problems. You may be at higher risk for health problems if you are not active, you eat an unhealthy diet, or you drink too much alcohol or use tobacco products. Follow-up care is a key part of your treatment and safety. Be sure to make and go to all appointments, and call your doctor if you are having problems. It's also a good idea to know your test results and keep a list of the medicines you take. How can you care for yourself at home? · Practice healthy eating habits. This includes eating plenty of fruits, vegetables, whole grains, lean protein, and low-fat dairy. · If your doctor recommends it, get more exercise. Walking is a good choice. Bit by bit, increase the amount you walk every day. Try for at least 30 minutes on most days of the week. · Do not smoke. Smoking can increase your risk for health problems. If you need help quitting, talk to your doctor about stop-smoking programs and medicines. These can increase your chances of quitting for good. · Limit alcohol to 2 drinks a day for men and 1 drink a day for women. Too much alcohol can cause health problems.   If you have a BMI higher than 25  · Your doctor may do other tests to check your risk for weight-related health problems. This may include measuring the distance around your waist. A waist measurement of more than 40 inches in men or 35 inches in women can increase the risk of weight-related health problems. · Talk with your doctor about steps you can take to stay healthy or improve your health. You may need to make lifestyle changes to lose weight and stay healthy, such as changing your diet and getting regular exercise. If you have a BMI lower than 18.5  · Your doctor may do other tests to check your risk for health problems. · Talk with your doctor about steps you can take to stay healthy or improve your health. You may need to make lifestyle changes to gain or maintain weight and stay healthy, such as getting more healthy foods in your diet and doing exercises to build muscle. Where can you learn more? Go to http://gunnar-johanna.info/. Enter S176 in the search box to learn more about \"Body Mass Index: Care Instructions. \"  Current as of: October 13, 2016  Content Version: 11.4  © 8164-0258 [employer]. Care instructions adapted under license by [employer] (which disclaims liability or warranty for this information). If you have questions about a medical condition or this instruction, always ask your healthcare professional. [place] [address] any warranty or liability for your use of this information. lab results and schedule of future lab studies reviewed with patient  reviewed medications and side effects in detail    Return to clinic for further evaluation if new symptoms develop        Current Outpatient Medications   Medication Sig    EDARBYCLOR 40-12.5 mg tab Take 1 Tab by mouth daily.  diclofenac (VOLTAREN) 1 % gel Apply  to affected area four (4) times daily. (Patient taking differently: Apply  to affected area daily as needed.)    sildenafil citrate (VIAGRA) 100 mg tablet Take 1 Tab by mouth as needed.     ibuprofen (MOTRIN) 200 mg tablet Take 200 mg by mouth as needed. No current facility-administered medications for this visit. Discussed the patient's BMI with him. The BMI follow up plan is as follows:     dietary management education, guidance, and counseling  encourage exercise  monitor weight  prescribed dietary intake    An After Visit Summary was printed and given to the patient.

## 2019-08-28 NOTE — PATIENT INSTRUCTIONS
Body Mass Index: Care Instructions  Your Care Instructions    Body mass index (BMI) can help you see if your weight is raising your risk for health problems. It uses a formula to compare how much you weigh with how tall you are. · A BMI lower than 18.5 is considered underweight. · A BMI between 18.5 and 24.9 is considered healthy. · A BMI between 25 and 29.9 is considered overweight. A BMI of 30 or higher is considered obese. If your BMI is in the normal range, it means that you have a lower risk for weight-related health problems. If your BMI is in the overweight or obese range, you may be at increased risk for weight-related health problems, such as high blood pressure, heart disease, stroke, arthritis or joint pain, and diabetes. If your BMI is in the underweight range, you may be at increased risk for health problems such as fatigue, lower protection (immunity) against illness, muscle loss, bone loss, hair loss, and hormone problems. BMI is just one measure of your risk for weight-related health problems. You may be at higher risk for health problems if you are not active, you eat an unhealthy diet, or you drink too much alcohol or use tobacco products. Follow-up care is a key part of your treatment and safety. Be sure to make and go to all appointments, and call your doctor if you are having problems. It's also a good idea to know your test results and keep a list of the medicines you take. How can you care for yourself at home? · Practice healthy eating habits. This includes eating plenty of fruits, vegetables, whole grains, lean protein, and low-fat dairy. · If your doctor recommends it, get more exercise. Walking is a good choice. Bit by bit, increase the amount you walk every day. Try for at least 30 minutes on most days of the week. · Do not smoke. Smoking can increase your risk for health problems. If you need help quitting, talk to your doctor about stop-smoking programs and medicines. These can increase your chances of quitting for good. · Limit alcohol to 2 drinks a day for men and 1 drink a day for women. Too much alcohol can cause health problems. If you have a BMI higher than 25  · Your doctor may do other tests to check your risk for weight-related health problems. This may include measuring the distance around your waist. A waist measurement of more than 40 inches in men or 35 inches in women can increase the risk of weight-related health problems. · Talk with your doctor about steps you can take to stay healthy or improve your health. You may need to make lifestyle changes to lose weight and stay healthy, such as changing your diet and getting regular exercise. If you have a BMI lower than 18.5  · Your doctor may do other tests to check your risk for health problems. · Talk with your doctor about steps you can take to stay healthy or improve your health. You may need to make lifestyle changes to gain or maintain weight and stay healthy, such as getting more healthy foods in your diet and doing exercises to build muscle. Where can you learn more? Go to http://gunnar-johanna.info/. Enter S176 in the search box to learn more about \"Body Mass Index: Care Instructions. \"  Current as of: October 13, 2016  Content Version: 11.4  © 0428-2452 [employer]. Care instructions adapted under license by [employer] (which disclaims liability or warranty for this information). If you have questions about a medical condition or this instruction, always ask your healthcare professional. [address] any warranty or liability for your use of this information.

## 2019-08-30 LAB
ALBUMIN SERPL-MCNC: 4.5 G/DL (ref 3.6–4.8)
ALBUMIN/GLOB SERPL: 1.4 {RATIO} (ref 1.2–2.2)
ALP SERPL-CCNC: 60 IU/L (ref 39–117)
ALT SERPL-CCNC: 24 IU/L (ref 0–44)
AST SERPL-CCNC: 25 IU/L (ref 0–40)
BASOPHILS # BLD MANUAL: 0 X10E3/UL (ref 0–0.2)
BASOPHILS NFR BLD MANUAL: 1 %
BILIRUB SERPL-MCNC: 0.4 MG/DL (ref 0–1.2)
BUN SERPL-MCNC: 26 MG/DL (ref 8–27)
BUN/CREAT SERPL: 19 (ref 10–24)
CALCIUM SERPL-MCNC: 9.6 MG/DL (ref 8.6–10.2)
CHLORIDE SERPL-SCNC: 99 MMOL/L (ref 96–106)
CHOLEST SERPL-MCNC: 195 MG/DL (ref 100–199)
CO2 SERPL-SCNC: 23 MMOL/L (ref 20–29)
CREAT SERPL-MCNC: 1.37 MG/DL (ref 0.76–1.27)
DIFFERENTIAL COMMENT, 115260: NORMAL
EOSINOPHIL # BLD MANUAL: 0.2 X10E3/UL (ref 0–0.4)
EOSINOPHIL NFR BLD MANUAL: 5 %
ERYTHROCYTE [DISTWIDTH] IN BLOOD BY AUTOMATED COUNT: 12.9 % (ref 12.3–15.4)
GLOBULIN SER CALC-MCNC: 3.2 G/DL (ref 1.5–4.5)
GLUCOSE SERPL-MCNC: 99 MG/DL (ref 65–99)
HCT VFR BLD AUTO: 39.6 % (ref 37.5–51)
HCV AB S/CO SERPL IA: <0.1 S/CO RATIO (ref 0–0.9)
HCV AB SERPL QL IA: NORMAL
HDLC SERPL-MCNC: 41 MG/DL
HGB BLD-MCNC: 13.3 G/DL (ref 13–17.7)
INTERPRETATION, 910389: NORMAL
INTERPRETATION: NORMAL
LDLC SERPL CALC-MCNC: 129 MG/DL (ref 0–99)
LYMPHOCYTES # BLD MANUAL: 2.2 X10E3/UL (ref 0.7–3.1)
LYMPHOCYTES NFR BLD MANUAL: 46 %
MCH RBC QN AUTO: 31.6 PG (ref 26.6–33)
MCHC RBC AUTO-ENTMCNC: 33.6 G/DL (ref 31.5–35.7)
MCV RBC AUTO: 94 FL (ref 79–97)
MONOCYTES # BLD MANUAL: 0.4 X10E3/UL (ref 0.1–0.9)
MONOCYTES NFR BLD MANUAL: 8 %
NEUTROPHILS # BLD MANUAL: 1.9 X10E3/UL (ref 1.4–7)
NEUTROPHILS NFR BLD MANUAL: 40 %
PDF IMAGE, 910387: NORMAL
PLATELET # BLD AUTO: 264 X10E3/UL (ref 150–450)
PLATELET BLD QL SMEAR: ADEQUATE
POTASSIUM SERPL-SCNC: 3.8 MMOL/L (ref 3.5–5.2)
PROT SERPL-MCNC: 7.7 G/DL (ref 6–8.5)
RBC # BLD AUTO: 4.21 X10E6/UL (ref 4.14–5.8)
RBC MORPH BLD: NORMAL
SODIUM SERPL-SCNC: 139 MMOL/L (ref 134–144)
TRIGL SERPL-MCNC: 127 MG/DL (ref 0–149)
VLDLC SERPL CALC-MCNC: 25 MG/DL (ref 5–40)
WBC # BLD AUTO: 4.7 X10E3/UL (ref 3.4–10.8)

## 2019-09-10 ENCOUNTER — TELEPHONE (OUTPATIENT)
Dept: INTERNAL MEDICINE CLINIC | Age: 69
End: 2019-09-10

## 2019-09-10 NOTE — TELEPHONE ENCOUNTER
I spoke with Dr. Mesfin Bennett office to schedule a new pt appt. They state we have to fax over pts records to the new patient coordinator and they will schedule pt an appt. Fax number [phone].

## 2020-03-18 DIAGNOSIS — N52.9 ERECTILE DYSFUNCTION, UNSPECIFIED ERECTILE DYSFUNCTION TYPE: ICD-10-CM

## 2020-03-18 DIAGNOSIS — I10 ESSENTIAL HYPERTENSION: ICD-10-CM

## 2020-03-19 NOTE — TELEPHONE ENCOUNTER
Dr. Curtis Jimenez   Received: Today   Message Contents   Lovekristi FELIZ sent to [employer]         Pt is requesting a call, regarding an order for \"Hypertension\" medication and generic \"Viagra\" that was not sent to [facility]  (unaware of exact medication names). Pt also has concerns about the pre-op appt scheduled for 06/05/2020. Toshia Cardona pt was told he will need a CT scan. Best contact number [phone].

## 2020-03-20 ENCOUNTER — TELEPHONE (OUTPATIENT)
Dept: INTERNAL MEDICINE CLINIC | Age: 70
End: 2020-03-20

## 2020-03-20 RX ORDER — AZILSARTAN KAMEDOXOMIL AND CHLORTHALIDONE 40; 12.5 MG/1; MG/1
1 TABLET ORAL DAILY
Qty: 90 TAB | Refills: 4 | Status: SHIPPED | OUTPATIENT
Start: 2020-03-20 | End: 2021-05-25 | Stop reason: ALTCHOICE

## 2020-03-20 RX ORDER — SILDENAFIL 100 MG/1
100 TABLET, FILM COATED ORAL AS NEEDED
Qty: 24 TAB | Refills: 3 | Status: SHIPPED | OUTPATIENT
Start: 2020-03-20 | End: 2021-06-27

## 2020-03-20 NOTE — TELEPHONE ENCOUNTER
Pt is calling back regarding his request for \"Hypertension\" medication and generic \"Viagra\" that was not sent to [facility] (unaware of exact medication names). He is running extremely low on hypertension med and becoming concerned. Would like a call with an update today. Thanks.

## 2020-06-05 ENCOUNTER — HOSPITAL ENCOUNTER (OUTPATIENT)
Dept: CT IMAGING | Age: 70
Discharge: HOME | End: 2020-06-05
Attending: ORTHOPAEDIC SURGERY
Payer: MEDICARE

## 2020-06-05 DIAGNOSIS — M17.11 OSTEOARTHRITIS OF RIGHT KNEE: ICD-10-CM

## 2020-06-05 PROCEDURE — 73700 CT LOWER EXTREMITY W/O DYE: CPT

## 2020-06-15 ENCOUNTER — TELEPHONE (OUTPATIENT)
Dept: SURGERY | Age: 70
End: 2020-06-15

## 2020-06-15 ENCOUNTER — HOSPITAL ENCOUNTER (OUTPATIENT)
Dept: PREADMISSION TESTING | Age: 70
Discharge: HOME | End: 2020-06-15
Payer: MEDICARE

## 2020-06-15 VITALS
OXYGEN SATURATION: 97 % | HEART RATE: 72 BPM | RESPIRATION RATE: 16 BRPM | TEMPERATURE: 98.4 F | BODY MASS INDEX: 34.27 KG/M2 | DIASTOLIC BLOOD PRESSURE: 66 MMHG | WEIGHT: 267 LBS | HEIGHT: 74 IN | SYSTOLIC BLOOD PRESSURE: 124 MMHG

## 2020-06-15 LAB
ABO + RH BLD: NORMAL
ALBUMIN SERPL-MCNC: 3.8 G/DL (ref 3.5–5)
ALBUMIN/GLOB SERPL: 0.9 {RATIO} (ref 1.1–2.2)
ALP SERPL-CCNC: 71 U/L (ref 45–117)
ALT SERPL-CCNC: 30 U/L (ref 12–78)
ANION GAP SERPL CALC-SCNC: 6 MMOL/L (ref 5–15)
APPEARANCE UR: CLEAR
APTT PPP: 24.3 SEC (ref 22.1–32)
AST SERPL-CCNC: 35 U/L (ref 15–37)
ATRIAL RATE: 59 BPM
BACTERIA URNS QL MICRO: NEGATIVE /HPF
BASOPHILS # BLD: 0 K/UL (ref 0–0.1)
BASOPHILS NFR BLD: 1 % (ref 0–1)
BILIRUB SERPL-MCNC: 0.5 MG/DL (ref 0.2–1)
BILIRUB UR QL: NEGATIVE
BLOOD GROUP ANTIBODIES SERPL: NORMAL
BUN SERPL-MCNC: 26 MG/DL (ref 6–20)
BUN/CREAT SERPL: 21 (ref 12–20)
CALCIUM SERPL-MCNC: 9 MG/DL (ref 8.5–10.1)
CALCULATED P AXIS, ECG09: 49 DEGREES
CALCULATED R AXIS, ECG10: 60 DEGREES
CALCULATED T AXIS, ECG11: 54 DEGREES
CHLORIDE SERPL-SCNC: 104 MMOL/L (ref 97–108)
CO2 SERPL-SCNC: 28 MMOL/L (ref 21–32)
COLOR UR: NORMAL
CREAT SERPL-MCNC: 1.23 MG/DL (ref 0.7–1.3)
DIAGNOSIS, 93000: NORMAL
DIFFERENTIAL METHOD BLD: ABNORMAL
EOSINOPHIL # BLD: 0.2 K/UL (ref 0–0.4)
EOSINOPHIL NFR BLD: 5 % (ref 0–7)
EPITH CASTS URNS QL MICRO: NORMAL /LPF
ERYTHROCYTE [DISTWIDTH] IN BLOOD BY AUTOMATED COUNT: 12.8 % (ref 11.5–14.5)
EST. AVERAGE GLUCOSE BLD GHB EST-MCNC: 117 MG/DL
GLOBULIN SER CALC-MCNC: 4.3 G/DL (ref 2–4)
GLUCOSE SERPL-MCNC: 97 MG/DL (ref 65–100)
GLUCOSE UR STRIP.AUTO-MCNC: NEGATIVE MG/DL
HBA1C MFR BLD: 5.7 % (ref 4–5.6)
HCT VFR BLD AUTO: 39.9 % (ref 36.6–50.3)
HGB BLD-MCNC: 13.5 G/DL (ref 12.1–17)
HGB UR QL STRIP: NEGATIVE
HYALINE CASTS URNS QL MICRO: NORMAL /LPF (ref 0–5)
IMM GRANULOCYTES # BLD AUTO: 0 K/UL (ref 0–0.04)
IMM GRANULOCYTES NFR BLD AUTO: 1 % (ref 0–0.5)
INR PPP: 1 (ref 0.9–1.1)
KETONES UR QL STRIP.AUTO: NEGATIVE MG/DL
LEUKOCYTE ESTERASE UR QL STRIP.AUTO: NEGATIVE
LYMPHOCYTES # BLD: 1.6 K/UL (ref 0.8–3.5)
LYMPHOCYTES NFR BLD: 41 % (ref 12–49)
MCH RBC QN AUTO: 32.2 PG (ref 26–34)
MCHC RBC AUTO-ENTMCNC: 33.8 G/DL (ref 30–36.5)
MCV RBC AUTO: 95.2 FL (ref 80–99)
MONOCYTES # BLD: 0.4 K/UL (ref 0–1)
MONOCYTES NFR BLD: 11 % (ref 5–13)
NEUTS SEG # BLD: 1.6 K/UL (ref 1.8–8)
NEUTS SEG NFR BLD: 41 % (ref 32–75)
NITRITE UR QL STRIP.AUTO: NEGATIVE
NRBC # BLD: 0 K/UL (ref 0–0.01)
NRBC BLD-RTO: 0 PER 100 WBC
P-R INTERVAL, ECG05: 184 MS
PH UR STRIP: 6.5 [PH] (ref 5–8)
PLATELET # BLD AUTO: 252 K/UL (ref 150–400)
PMV BLD AUTO: 8.4 FL (ref 8.9–12.9)
POTASSIUM SERPL-SCNC: 3.9 MMOL/L (ref 3.5–5.1)
PROT SERPL-MCNC: 8.1 G/DL (ref 6.4–8.2)
PROT UR STRIP-MCNC: NEGATIVE MG/DL
PROTHROMBIN TIME: 10.5 SEC (ref 9–11.1)
Q-T INTERVAL, ECG07: 426 MS
QRS DURATION, ECG06: 100 MS
QTC CALCULATION (BEZET), ECG08: 421 MS
RBC # BLD AUTO: 4.19 M/UL (ref 4.1–5.7)
RBC #/AREA URNS HPF: NORMAL /HPF (ref 0–5)
SODIUM SERPL-SCNC: 138 MMOL/L (ref 136–145)
SP GR UR REFRACTOMETRY: 1.02 (ref 1–1.03)
SPECIMEN EXP DATE BLD: NORMAL
THERAPEUTIC RANGE,PTTT: NORMAL SECS (ref 58–77)
UA: UC IF INDICATED,UAUC: NORMAL
UROBILINOGEN UR QL STRIP.AUTO: 1 EU/DL (ref 0.2–1)
VENTRICULAR RATE, ECG03: 59 BPM
WBC # BLD AUTO: 3.9 K/UL (ref 4.1–11.1)
WBC URNS QL MICRO: NORMAL /HPF (ref 0–4)

## 2020-06-15 PROCEDURE — 36415 COLL VENOUS BLD VENIPUNCTURE: CPT

## 2020-06-15 PROCEDURE — 83036 HEMOGLOBIN GLYCOSYLATED A1C: CPT

## 2020-06-15 PROCEDURE — 81001 URINALYSIS AUTO W/SCOPE: CPT

## 2020-06-15 PROCEDURE — 93005 ELECTROCARDIOGRAM TRACING: CPT

## 2020-06-15 PROCEDURE — 85025 COMPLETE CBC W/AUTO DIFF WBC: CPT

## 2020-06-15 PROCEDURE — 85730 THROMBOPLASTIN TIME PARTIAL: CPT

## 2020-06-15 PROCEDURE — 85610 PROTHROMBIN TIME: CPT

## 2020-06-15 PROCEDURE — 80053 COMPREHEN METABOLIC PANEL: CPT

## 2020-06-15 PROCEDURE — 86900 BLOOD TYPING SEROLOGIC ABO: CPT

## 2020-06-15 NOTE — H&P
Preoperative Evaluation                     History and Physical with Surgical Risk Stratification     6/15/2020    CC: Right knee pain  Surgery: RTK     HPI:   Jimmy Quispe is a 71 y.o. male referred for pre-operative evaluation by Dr. Phill Garcia for surgery on 6/24/20. Mr. Joya Reynaga spends his muller in [facility] and loves to ski. He notes this past winter he was able to ski but he started to notice an increase in pain. The last week he was there he was not able to participate. He denies swelling or weakness. The pain at times will make his limp. He has some referred pain in his hip recently. Pain is intermittent with activity. He was sent to Dr. Alejandro Ballesteros for abnormal gum bleeding with cleanings. This has been going on for several years and started out of the blue. All workup has been negative. The patient was evaluated in the surgeon's office and it was determined that the most appropriate plan of care is to proceed with surgical intervention. Patient's PCP Jessie Henao MD    Review of Systems     Constitutional: Negative for chills and fever  HENT: Negative for congestion and sore throat  Eyes: negative for blurred vision and double vision  Respiratory: Negative for cough, shortness of breath and wheezing  Mouth: Negative for loose, broken or chipped teeth. Cardiovascular: Negative for chest pain and palpitations  Gastrointestinal: Negative for abdominal pain, constipation, diarrhea and nausea  Genitourinary: Negative for dysuria and hematuria  Musculoskeletal: Knee pain  Skin: Negative for rash, open wounds. Negative for bruises easily  Neurological: Negative for dizziness, tremors and headaches  Psychiatric: Negative for depression. The patient is not nervous/anxious.     Inherent Risk of Surgery     Surgical risk:  Intermediate  Low:  EIntermediate:  Joint Replacement, High:    Patient Cardiac Risk Assessment     Revised Cardiac Risk Index (RCRI)    Rate if cardiac death, nonfatal MI, nonfatal cardiac arrest by number of risk factor- 0.4%    AILEEN/AHA 2007 Guidelines:   1) Surgery Emergency, Non-cardiac -> to surgery  2) If not, look at clinical predictors    Major Intermediate Minor       Blood Thinner: NA    METS     >4 METS Climb a flight of stairs or a hill Walk on level ground at 4 mph Run a short distance Heavy work around house (scrub floors, move furniture)     Other Risk Factors:   Screening for ETOH use:  Done and low risk  Smoking status:  Former     Personal or FH of bleeding problems:  No  Personal or FH of blood clots:  No  Personal or FH of anesthesia problems:   No    Pulmonary Risk:  Asthma or COPD:  No  Body mass index is 34.28 kg/m². Known TOMY:  Yes  Albumin normal, BUN abnormal    Past Medical, Surgical, Social History     Allergies: Allergies   Allergen Reactions    Ace Inhibitors Cough       Medication Documentation Review Audit     Reviewed by Rafa Escalante RN (Registered Nurse) on 06/15/20 at 0199    Medication Sig Documenting Provider Last Dose Status Taking? Edarbyclor 40-12.5 mg tab Take 1 Tab by mouth daily. Arielle Tompkins MD  Active    ibuprofen (MOTRIN) 200 mg tablet Take 200 mg by mouth as needed. Provider, Historical  Active    sildenafil citrate (Viagra) 100 mg tablet Take 1 Tab by mouth as needed for Erectile Dysfunction. Indications: the inability to have an erection Port, Bala Cervantes MD  Active               Past Medical History:   Diagnosis Date    Aortic valve sclerosis 2015    no stenosis. normal stress echo 9/2016    ARF (acute renal failure) ([facility] [address] 9/23/2012    Basal cell carcinoma of skin 2018    left shin.       Bleeding gums 07/02/2018    All workup negative from Hematology    Bruised ribs 01/10/2019    Chronic heel pain, right 07/02/2018    Concussion 01/10/2019    Glaucoma     Dr. Louise Pollock History of kidney stones     HLD (hyperlipidemia)     Hypertension     Normal cardiac stress test 09/14/2016    TOMY (obstructive sleep apnea) 2016    mild TOMY    Osteoarthritis, hand     Pneumonia 2012    Syncope and collapse 2012    due to pneumonia,  recurred  (no evaluation)     Past Surgical History:   Procedure Laterality Date    HX COLONOSCOPY      Dr. Wanda Andujar    HX LITHOTRIPSY      HX ORTHOPAEDIC      toe surgery     HX TONSILLECTOMY      HX VASECTOMY       Social History     Tobacco Use    Smoking status: Former Smoker     Packs/day: 1.50     Types: Cigarettes     Last attempt to quit: 12/15/1980     Years since quittin.5    Smokeless tobacco: Never Used   Substance Use Topics    Alcohol use: Yes     Alcohol/week: 4.0 standard drinks     Types: 4 Cans of beer per week    Drug use: No     Family History   Problem Relation Age of Onset    Hypertension Mother     Stroke Mother     Cancer Mother         breast    Hypertension Father     Heart Disease Father         valvular    Deep Vein Thrombosis Neg Hx     Anesth Problems Neg Hx        Objective     Vitals:    06/15/20 0912   BP: 124/66   Pulse: 72   Resp: 16   Temp: 98.4 °F (36.9 °C)   SpO2: 97%   Weight: 121.1 kg (267 lb)   Height: 6' 2\" (1.88 m)       Constitutional:  Appears well,  No Acute Distress, Vitals noted  Psychiatric:   Affect normal, Alert and Oriented to person/place/time    Eyes:   Pupils equally round and reactive, EOMI, conjunctiva clear, eyelids normal  ENT:   External ears and nose normal, teeth normal, gums normal, TMs and Orophyarynx normal  Neck:   General inspection and Thyroid normal.  No abnormal cervical or supraclavicular nodes    Lungs:   Clear to auscultation, good respiratory effort  Heart: Ausculation normal.  Regular rhythm. No cardiac murmurs. No carotid bruits or palpable thrills  Chest wall normal  Musculoskeletal: Gait antalgic.    Extremities:   Without edema, good peripheral pulses  Skin:   Warm to palpation, without rashes, bruising, or suspicious lesions     Recent Results (from the past 72 hour(s))   CBC WITH AUTOMATED DIFF    Collection Time: 06/15/20  9:58 AM   Result Value Ref Range    WBC 3.9 (L) 4.1 - 11.1 K/uL    RBC 4.19 4. 10 - 5.70 M/uL    HGB 13.5 12.1 - 17.0 g/dL    HCT 39.9 36.6 - 50.3 %    MCV 95.2 80.0 - 99.0 FL    MCH 32.2 26.0 - 34.0 PG    MCHC 33.8 30.0 - 36.5 g/dL    RDW 12.8 11.5 - 14.5 %    PLATELET 804 327 - 943 K/uL    MPV 8.4 (L) 8.9 - 12.9 FL    NRBC 0.0 0  WBC    ABSOLUTE NRBC 0.00 0.00 - 0.01 K/uL    NEUTROPHILS 41 32 - 75 %    LYMPHOCYTES 41 12 - 49 %    MONOCYTES 11 5 - 13 %    EOSINOPHILS 5 0 - 7 %    BASOPHILS 1 0 - 1 %    IMMATURE GRANULOCYTES 1 (H) 0.0 - 0.5 %    ABS. NEUTROPHILS 1.6 (L) 1.8 - 8.0 K/UL    ABS. LYMPHOCYTES 1.6 0.8 - 3.5 K/UL    ABS. MONOCYTES 0.4 0.0 - 1.0 K/UL    ABS. EOSINOPHILS 0.2 0.0 - 0.4 K/UL    ABS. BASOPHILS 0.0 0.0 - 0.1 K/UL    ABS. IMM. GRANS. 0.0 0.00 - 0.04 K/UL    DF AUTOMATED     METABOLIC PANEL, COMPREHENSIVE    Collection Time: 06/15/20  9:58 AM   Result Value Ref Range    Sodium 138 136 - 145 mmol/L    Potassium 3.9 3.5 - 5.1 mmol/L    Chloride 104 97 - 108 mmol/L    CO2 28 21 - 32 mmol/L    Anion gap 6 5 - 15 mmol/L    Glucose 97 65 - 100 mg/dL    BUN 26 (H) 6 - 20 MG/DL    Creatinine 1.23 0.70 - 1.30 MG/DL    BUN/Creatinine ratio 21 (H) 12 - 20      GFR est AA >60 >60 ml/min/1.73m2    GFR est non-AA 58 (L) >60 ml/min/1.73m2    Calcium 9.0 8.5 - 10.1 MG/DL    Bilirubin, total 0.5 0.2 - 1.0 MG/DL    ALT (SGPT) 30 12 - 78 U/L    AST (SGOT) 35 15 - 37 U/L    Alk.  phosphatase 71 45 - 117 U/L    Protein, total 8.1 6.4 - 8.2 g/dL    Albumin 3.8 3.5 - 5.0 g/dL    Globulin 4.3 (H) 2.0 - 4.0 g/dL    A-G Ratio 0.9 (L) 1.1 - 2.2     HEMOGLOBIN A1C WITH EAG    Collection Time: 06/15/20  9:58 AM   Result Value Ref Range    Hemoglobin A1c 5.7 (H) 4.0 - 5.6 %    Est. average glucose 117 mg/dL   PROTHROMBIN TIME + INR    Collection Time: 06/15/20  9:58 AM   Result Value Ref Range    INR 1.0 0.9 - 1.1      Prothrombin time 10.5 9.0 - 11.1 sec   PTT    Collection Time: 06/15/20  9:58 AM   Result Value Ref Range    aPTT 24.3 22.1 - 32.0 sec    aPTT, therapeutic range     58.0 - 77.0 SECS   URINALYSIS W/ REFLEX CULTURE    Collection Time: 06/15/20  9:58 AM   Result Value Ref Range    Color YELLOW/STRAW      Appearance CLEAR CLEAR      Specific gravity 1.023 1.003 - 1.030      pH (UA) 6.5 5.0 - 8.0      Protein Negative NEG mg/dL    Glucose Negative NEG mg/dL    Ketone Negative NEG mg/dL    Bilirubin Negative NEG      Blood Negative NEG      Urobilinogen 1.0 0.2 - 1.0 EU/dL    Nitrites Negative NEG      Leukocyte Esterase Negative NEG      WBC 0-4 0 - 4 /hpf    RBC 0-5 0 - 5 /hpf    Epithelial cells FEW FEW /lpf    Bacteria Negative NEG /hpf    UA:UC IF INDICATED CULTURE NOT INDICATED BY UA RESULT CNI      Hyaline cast 0-2 0 - 5 /lpf   TYPE & SCREEN    Collection Time: 06/15/20  9:58 AM   Result Value Ref Range    Crossmatch Expiration 06/27/2020     ABO/Rh(D) A POSITIVE     Antibody screen NEG    EKG, 12 LEAD, INITIAL    Collection Time: 06/15/20 10:25 AM   Result Value Ref Range    Ventricular Rate 59 BPM    Atrial Rate 59 BPM    P-R Interval 184 ms    QRS Duration 100 ms    Q-T Interval 426 ms    QTC Calculation (Bezet) 421 ms    Calculated P Axis 49 degrees    Calculated R Axis 60 degrees    Calculated T Axis 54 degrees    Diagnosis       Sinus bradycardia  Otherwise normal ECG  Confirmed by [id number], Rhoda PINO, Petr ([id number]) on 6/15/2020 1:15:40 PM         Assessment and Plan     Assessment/Plan:   1) OA Right Knee  2) Pre-Operative Evaluation    Labs and EKG reviewed. MRSA pending    Preoperative Clearance  Per RCRI, the patient has a 0.4% risk of cardiac death, nonfatal MI, nonfatal cardiac arrest based on no risk factors. Per ACC/AHA guidelines, patient is low risk for a(n) intermediate risk surgery and may proceed to planned surgery with the above noted risk.     Vitaliy West NP

## 2020-06-15 NOTE — H&P (VIEW-ONLY)
Preoperative Evaluation History and Physical with Surgical Risk Stratification 6/15/2020 CC: Right knee pain Surgery: RTK  
 
HPI:  
Elyce Curling is a 71 y.o. male referred for pre-operative evaluation by Dr. Karen Rosales for surgery on 6/24/20. Mr. Carolyn Sanchez spends his muller in [facility] and loves to ski. He notes this past winter he was able to ski but he started to notice an increase in pain. The last week he was there he was not able to participate. He denies swelling or weakness. The pain at times will make his limp. He has some referred pain in his hip recently. Pain is intermittent with activity. He was sent to Dr. Peewee Burton for abnormal gum bleeding with cleanings. This has been going on for several years and started out of the blue. All workup has been negative. The patient was evaluated in the surgeon's office and it was determined that the most appropriate plan of care is to proceed with surgical intervention. Patient's PCP Dara Dietrich MD 
 
Review of Systems Constitutional: Negative for chills and fever HENT: Negative for congestion and sore throat Eyes: negative for blurred vision and double vision Respiratory: Negative for cough, shortness of breath and wheezing Mouth: Negative for loose, broken or chipped teeth. Cardiovascular: Negative for chest pain and palpitations Gastrointestinal: Negative for abdominal pain, constipation, diarrhea and nausea Genitourinary: Negative for dysuria and hematuria Musculoskeletal: Knee pain Skin: Negative for rash, open wounds. Negative for bruises easily Neurological: Negative for dizziness, tremors and headaches Psychiatric: Negative for depression. The patient is not nervous/anxious. Inherent Risk of Surgery Surgical risk:  Intermediate Low:  EIntermediate:  Joint Replacement, High:   
Patient Cardiac Risk Assessment Revised Cardiac Risk Index (RCRI) Rate if cardiac death, nonfatal MI, nonfatal cardiac arrest by number of risk factor- 0.4% AILEEN/AHA 2007 Guidelines:  
1) Surgery Emergency, Non-cardiac -> to surgery 2) If not, look at clinical predictors Major Intermediate Minor Blood Thinner: NA 
 
METS  
 
>4 METS Climb a flight of stairs or a hill Walk on level ground at 4 mph Run a short distance Heavy work around house (scrub floors, move furniture) Other Risk Factors:  
Screening for ETOH use:  Done and low risk Smoking status:  Former Personal or FH of bleeding problems:  No 
Personal or FH of blood clots:  No 
Personal or FH of anesthesia problems:   No 
 
Pulmonary Risk: 
Asthma or COPD:  No 
Body mass index is 34.28 kg/m². Known TOMY:  Yes Albumin normal, BUN abnormal 
 
Past Medical, Surgical, Social History Allergies: Allergies Allergen Reactions  Ace Inhibitors Cough Medication Documentation Review Audit Reviewed by Wesly Barraza RN (Registered Nurse) on 06/15/20 at 0475 Medication Sig Documenting Provider Last Dose Status Taking? Edarbyclor 40-12.5 mg tab Take 1 Tab by mouth daily. Jessie Henao MD  Active   
ibuprofen (MOTRIN) 200 mg tablet Take 200 mg by mouth as needed. Provider, Historical  Active   
sildenafil citrate (Viagra) 100 mg tablet Take 1 Tab by mouth as needed for Erectile Dysfunction. Indications: the inability to have an erection Jessie Henao MD  Active Past Medical History:  
Diagnosis Date  Aortic valve sclerosis 2015  
 no stenosis. normal stress echo 9/2016  ARF (acute renal failure) ([facility] [address] 9/23/2012  Basal cell carcinoma of skin 2018  
 left shin.  Bleeding gums 07/02/2018 All workup negative from Hematology  Bruised ribs 01/10/2019  Chronic heel pain, right 07/02/2018  Concussion 01/10/2019  Glaucoma Dr. Vibha Jerez  History of kidney stones  HLD (hyperlipidemia)  Hypertension  Normal cardiac stress test 09/14/2016  TOMY (obstructive sleep apnea) 2016  
 mild TOMY  Osteoarthritis, hand  Pneumonia 2012  Syncope and collapse 2012  
 due to pneumonia,  recurred  (no evaluation) Past Surgical History:  
Procedure Laterality Date  HX COLONOSCOPY   Dr. Boy Chaidez  HX LITHOTRIPSY  HX ORTHOPAEDIC    
 toe surgery  HX TONSILLECTOMY  HX VASECTOMY Social History Tobacco Use  Smoking status: Former Smoker Packs/day: 1.50 Types: Cigarettes Last attempt to quit: 12/15/1980 Years since quittin.5  Smokeless tobacco: Never Used Substance Use Topics  Alcohol use: Yes Alcohol/week: 4.0 standard drinks Types: 4 Cans of beer per week  Drug use: No  
 
Family History Problem Relation Age of Onset  Hypertension Mother  Stroke Mother  Cancer Mother   
     breast  
 Hypertension Father  Heart Disease Father   
     valvular  Deep Vein Thrombosis Neg Hx  Anesth Problems Neg Hx Objective Vitals:  
 06/15/20 0912 BP: 124/66 Pulse: 72 Resp: 16 Temp: 98.4 °F (36.9 °C) SpO2: 97% Weight: 121.1 kg (267 lb) Height: 6' 2\" (1.88 m) Constitutional:  Appears well,  No Acute Distress, Vitals noted Psychiatric:   Affect normal, Alert and Oriented to person/place/time Eyes:   Pupils equally round and reactive, EOMI, conjunctiva clear, eyelids normal 
ENT:   External ears and nose normal, teeth normal, gums normal, TMs and Orophyarynx normal 
Neck:   General inspection and Thyroid normal.  No abnormal cervical or supraclavicular nodes Lungs:   Clear to auscultation, good respiratory effort Heart: Ausculation normal.  Regular rhythm. No cardiac murmurs. No carotid bruits or palpable thrills Chest wall normal 
Musculoskeletal: Gait antalgic. Extremities:   Without edema, good peripheral pulses Skin:   Warm to palpation, without rashes, bruising, or suspicious lesions Recent Results (from the past 72 hour(s)) CBC WITH AUTOMATED DIFF Collection Time: 06/15/20  9:58 AM  
Result Value Ref Range WBC 3.9 (L) 4.1 - 11.1 K/uL  
 RBC 4.19 4. 10 - 5.70 M/uL  
 HGB 13.5 12.1 - 17.0 g/dL HCT 39.9 36.6 - 50.3 % MCV 95.2 80.0 - 99.0 FL  
 MCH 32.2 26.0 - 34.0 PG  
 MCHC 33.8 30.0 - 36.5 g/dL  
 RDW 12.8 11.5 - 14.5 % PLATELET 922 234 - 302 K/uL MPV 8.4 (L) 8.9 - 12.9 FL  
 NRBC 0.0 0  WBC ABSOLUTE NRBC 0.00 0.00 - 0.01 K/uL NEUTROPHILS 41 32 - 75 % LYMPHOCYTES 41 12 - 49 % MONOCYTES 11 5 - 13 % EOSINOPHILS 5 0 - 7 % BASOPHILS 1 0 - 1 % IMMATURE GRANULOCYTES 1 (H) 0.0 - 0.5 % ABS. NEUTROPHILS 1.6 (L) 1.8 - 8.0 K/UL  
 ABS. LYMPHOCYTES 1.6 0.8 - 3.5 K/UL  
 ABS. MONOCYTES 0.4 0.0 - 1.0 K/UL  
 ABS. EOSINOPHILS 0.2 0.0 - 0.4 K/UL  
 ABS. BASOPHILS 0.0 0.0 - 0.1 K/UL  
 ABS. IMM. GRANS. 0.0 0.00 - 0.04 K/UL  
 DF AUTOMATED METABOLIC PANEL, COMPREHENSIVE Collection Time: 06/15/20  9:58 AM  
Result Value Ref Range Sodium 138 136 - 145 mmol/L Potassium 3.9 3.5 - 5.1 mmol/L Chloride 104 97 - 108 mmol/L  
 CO2 28 21 - 32 mmol/L Anion gap 6 5 - 15 mmol/L Glucose 97 65 - 100 mg/dL BUN 26 (H) 6 - 20 MG/DL Creatinine 1.23 0.70 - 1.30 MG/DL  
 BUN/Creatinine ratio 21 (H) 12 - 20 GFR est AA >60 >60 ml/min/1.73m2 GFR est non-AA 58 (L) >60 ml/min/1.73m2 Calcium 9.0 8.5 - 10.1 MG/DL Bilirubin, total 0.5 0.2 - 1.0 MG/DL  
 ALT (SGPT) 30 12 - 78 U/L  
 AST (SGOT) 35 15 - 37 U/L Alk. phosphatase 71 45 - 117 U/L Protein, total 8.1 6.4 - 8.2 g/dL Albumin 3.8 3.5 - 5.0 g/dL Globulin 4.3 (H) 2.0 - 4.0 g/dL A-G Ratio 0.9 (L) 1.1 - 2.2 HEMOGLOBIN A1C WITH EAG Collection Time: 06/15/20  9:58 AM  
Result Value Ref Range Hemoglobin A1c 5.7 (H) 4.0 - 5.6 % Est. average glucose 117 mg/dL PROTHROMBIN TIME + INR Collection Time: 06/15/20  9:58 AM  
Result Value Ref Range INR 1.0 0.9 - 1.1 Prothrombin time 10.5 9.0 - 11.1 sec PTT Collection Time: 06/15/20  9:58 AM  
Result Value Ref Range aPTT 24.3 22.1 - 32.0 sec  
 aPTT, therapeutic range     58.0 - 77.0 SECS  
URINALYSIS W/ REFLEX CULTURE Collection Time: 06/15/20  9:58 AM  
Result Value Ref Range Color YELLOW/STRAW Appearance CLEAR CLEAR Specific gravity 1.023 1.003 - 1.030    
 pH (UA) 6.5 5.0 - 8.0 Protein Negative NEG mg/dL Glucose Negative NEG mg/dL Ketone Negative NEG mg/dL Bilirubin Negative NEG Blood Negative NEG Urobilinogen 1.0 0.2 - 1.0 EU/dL Nitrites Negative NEG Leukocyte Esterase Negative NEG    
 WBC 0-4 0 - 4 /hpf  
 RBC 0-5 0 - 5 /hpf Epithelial cells FEW FEW /lpf Bacteria Negative NEG /hpf  
 UA:UC IF INDICATED CULTURE NOT INDICATED BY UA RESULT CNI Hyaline cast 0-2 0 - 5 /lpf  
TYPE & SCREEN Collection Time: 06/15/20  9:58 AM  
Result Value Ref Range Crossmatch Expiration 06/27/2020 ABO/Rh(D) A POSITIVE Antibody screen NEG   
EKG, 12 LEAD, INITIAL Collection Time: 06/15/20 10:25 AM  
Result Value Ref Range Ventricular Rate 59 BPM  
 Atrial Rate 59 BPM  
 P-R Interval 184 ms QRS Duration 100 ms Q-T Interval 426 ms  
 QTC Calculation (Bezet) 421 ms Calculated P Axis 49 degrees Calculated R Axis 60 degrees Calculated T Axis 54 degrees Diagnosis Sinus bradycardia Otherwise normal ECG Confirmed by [id number], Rhoda PINO, Chidi Pineda ([id number]) on 6/15/2020 1:15:40 PM 
  
 
 
Assessment and Plan Assessment/Plan:  
1) OA Right Knee 2) Pre-Operative Evaluation Labs and EKG reviewed. MRSA pending Preoperative Clearance Per RCRI, the patient has a 0.4% risk of cardiac death, nonfatal MI, nonfatal cardiac arrest based on no risk factors. Per ACC/AHA guidelines, patient is low risk for a(n) intermediate risk surgery and may proceed to planned surgery with the above noted risk.  
 
Bakari Hernandez NP

## 2020-06-15 NOTE — PERIOP NOTES
It is now mandated that all surgical patients be tested for COVID-19 prior to surgery. Testing has to be exactly 4 days prior to surgery. Your COVID test date is Saturday, June, 20th between 8:00 am and 11:00 am.       COVID testing will be performed curbside at the [address] [place] [address] sejal. There will be signs leading you to the testing site. You will need to bring a photo ID with you to be swabbed. Patients are advised to self-quarantine at home after testing and prior to your surgery date. You will be notified if your results are positive. What to watch for:   Coronavirus (COVID-19) affects different people in different ways   It also appears with a wide range of symptoms from mild to severe   Signs usually appear 2-14 days after exposure     If you develop any of the following, notify your doctor immediately:  o Fever  o Chills, with or without a shiver  o Muscle pain  o Headache  o Sore throat  o Dry cough  o New loss of taste or smell  o Tiredness      If you develop any of the following, call 911:  o Shortness of breath  o Difficulty breathing  o Chest pain  o New confusion  Blueness of fingers and/or lips  [address]                                  [phone]   MAIN PRE OP                          [phone]                                                                                AMBULATORY PRE OP          [phone]  PRE-ADMISSION TESTING    [phone]     Surgery Date: Wednesday, June 24th*       Is surgery arrival time given by surgeon? NO  If NO, [facility] staff will call you between 3 and 7pm the day before your surgery with your arrival time. (If your surgery is on a Monday, we will call you the Friday before.)    Call [phone] after 7pm Monday-Friday if you did not receive this call.     INSTRUCTIONS BEFORE YOUR SURGERY   When You  Arrive Arrive at the [address] [facility] on the day of your surgery  Have your insurance card, photo ID, and any copayment (if needed)   Food   and   Drink NO food or drink after midnight the night before surgery    This means NO water, gum, mints, coffee, juice, etc.  No alcohol (beer, wine, liquor) 24 hours before and after surgery   Medications to   TAKE   Morning of Surgery MEDICATIONS TO TAKE THE MORNING OF SURGERY WITH A SIP OF WATER:   NONE   Medications  To  STOP      7 days before surgery Non-Steroidal anti-inflammatory Drugs (NSAID's): for example, Ibuprofen (Advil, Motrin), Naproxen (Aleve)  Aspirin, if taking for pain   Herbal supplements, vitamins, and fish oil  Other:  (Pain medications not listed above, including Tylenol may be taken)       Bathing Clothing  Jewelry  Valuables     If you shower the morning of surgery, please do not apply anything to your skin (lotions, powders, deodorant, or makeup, especially mascara)  Follow Chlorhexidine [employer] body wash instructions provided to you during PAT appointment. Begin 3 days prior to surgery. Do not shave or trim anywhere 24 hours before surgery  Wear your hair loose or down; no pony-tails, buns, or metal hair clips  Wear loose, comfortable, clean clothes  Wear glasses instead of contacts  Leave money, valuables, and jewelry, including body piercings, at home   Going Home - or Spending the Night Bring overnight bag to Preop on Day of [address] discharge time is 12 noon  Drivers must be here before 12 noon unless you are told differently   Special Instructions COVID19     Follow all instructions so your surgery wont be cancelled. Please, be on time. If a situation occurs and you are delayed the day of surgery, call [phone] or [phone].     If your physical condition changes (like a fever, cold, flu, etc.) call your surgeon  Home medication(s) reviewed and verified via      LIST   VERBAL   during PAT appointment. The patient was contacted by     IN-PERSON  The patient verbalizes understanding of all instructions and     DOES NOT   need reinforcement.   o

## 2020-06-16 LAB
BACTERIA SPEC CULT: NORMAL
BACTERIA SPEC CULT: NORMAL
SERVICE CMNT-IMP: NORMAL

## 2020-06-17 RX ORDER — MUPIROCIN 20 MG/G
OINTMENT TOPICAL 2 TIMES DAILY
Qty: 22 G | Refills: 0 | Status: SHIPPED | OUTPATIENT
Start: 2020-06-17 | End: 2020-06-22

## 2020-06-17 NOTE — PROGRESS NOTES
Notification of Positive MSSA and Treatment Ordered by Preadmission Testing Nurse Practitioner      Patient Name:  Gaby Steven  MRN: [id number]  : 1950    Surgeon: Luisito Manley  Date of surgery: 20  Procedure: RTK    Allergies: Allergies   Allergen Reactions    Ace Inhibitors Cough       MRSA results: All Micro Results     Procedure Component Value Units Date/Time    MRSA CULTURE NARES [[id number]] Collected:  06/15/20 0958    Order Status:  Completed Specimen:  Nasal from Nares Updated:  20 1511     Special Requests: NO SPECIAL REQUESTS        Culture result:       MRSA NOT PRESENT. Apparent Staphylococus aureus (not MRSA noted). Screening of patient nares for MRSA is for surveillance purposes and, if positive, to facilitate isolation considerations in high risk settings. It is not intended for automatic decolonization interventions per se as regimens are not sufficiently effective to warrant routine use.                 Treatment ordered: Bactroban ointment 2%- apply intranasal twice daily for 5 days    Date patient notified of results / treatment prescribed: 20    The patient was instructed to add medication and date they began treatment to their \"Prior to Admission Medication\" list given to them in [address], [place]

## 2020-06-19 ENCOUNTER — TELEPHONE (OUTPATIENT)
Dept: SURGERY | Age: 70
End: 2020-06-19

## 2020-06-19 NOTE — TELEPHONE ENCOUNTER
The patient was provided a virtul link to view the pre-operative Joint replacement Class  A pre-operative Patient education booklet specific to hip /knee replacement surgery was given to the patient in [facility]. The content of the class was presented using an audio power point presentation specific for patients undergoing hip / knee replacement surgery. Incentive spirometer and CHG bath kits were verbally reviewed. Day of surgery routine and expectations, hospital routine and expectations, nutrition, alcohol, nicotine, medications, infection control, pain management, DVT precautions and equipment, ice therapy, durable medical equipment, exercises, mobility expectations and precautions, home preparation and safety were reviewed in class. My contact information was shared with the patient to provide further information as requested by the patient related to their upcoming surgery. Patient states that they viewed the class. Questions answered.

## 2020-06-20 ENCOUNTER — HOSPITAL ENCOUNTER (OUTPATIENT)
Dept: PREADMISSION TESTING | Age: 70
Discharge: HOME | End: 2020-06-20
Payer: MEDICARE

## 2020-06-20 ENCOUNTER — HOSPITAL ENCOUNTER (OUTPATIENT)
Dept: CT IMAGING | Age: 70
Discharge: HOME | End: 2020-06-20
Attending: ORTHOPAEDIC SURGERY
Payer: MEDICARE

## 2020-06-20 DIAGNOSIS — M17.11 OSTEOARTHRITIS OF RIGHT KNEE: ICD-10-CM

## 2020-06-20 PROCEDURE — 87635 SARS-COV-2 COVID-19 AMP PRB: CPT

## 2020-06-20 PROCEDURE — 73700 CT LOWER EXTREMITY W/O DYE: CPT

## 2020-06-21 LAB — SARS-COV-2, COV2NT: NOT DETECTED

## 2020-06-23 ENCOUNTER — ANESTHESIA EVENT (OUTPATIENT)
Dept: SURGERY | Age: 70
End: 2020-06-23
Payer: MEDICARE

## 2020-06-24 ENCOUNTER — ANESTHESIA (OUTPATIENT)
Dept: SURGERY | Age: 70
End: 2020-06-24
Payer: MEDICARE

## 2020-06-24 ENCOUNTER — HOSPITAL ENCOUNTER (OUTPATIENT)
Age: 70
Setting detail: OBSERVATION
Discharge: HOME | End: 2020-06-25
Attending: ORTHOPAEDIC SURGERY | Admitting: ORTHOPAEDIC SURGERY
Payer: MEDICARE

## 2020-06-24 ENCOUNTER — APPOINTMENT (OUTPATIENT)
Dept: GENERAL RADIOLOGY | Age: 70
End: 2020-06-24
Attending: ORTHOPAEDIC SURGERY
Payer: MEDICARE

## 2020-06-24 DIAGNOSIS — M17.11 PRIMARY LOCALIZED OSTEOARTHRITIS OF RIGHT KNEE: Primary | ICD-10-CM

## 2020-06-24 PROCEDURE — 74011250636 HC RX REV CODE- 250/636

## 2020-06-24 PROCEDURE — 77030038149 HC BLD SAW SAG STRY -D: Performed by: ORTHOPAEDIC SURGERY

## 2020-06-24 PROCEDURE — 77030018673: Performed by: ORTHOPAEDIC SURGERY

## 2020-06-24 PROCEDURE — 77030011640 HC PAD GRND REM COVD -A: Performed by: ORTHOPAEDIC SURGERY

## 2020-06-24 PROCEDURE — 74011000250 HC RX REV CODE- 250: Performed by: ORTHOPAEDIC SURGERY

## 2020-06-24 PROCEDURE — 73560 X-RAY EXAM OF KNEE 1 OR 2: CPT

## 2020-06-24 PROCEDURE — 77030003601 HC NDL NRV BLK BBMI -A

## 2020-06-24 PROCEDURE — 77030013708 HC HNDPC SUC IRR PULS STRY –B: Performed by: ORTHOPAEDIC SURGERY

## 2020-06-24 PROCEDURE — 77030020274 HC MISC IMPL ORTHOPEDIC: Performed by: ORTHOPAEDIC SURGERY

## 2020-06-24 PROCEDURE — 99218 HC RM OBSERVATION: CPT

## 2020-06-24 PROCEDURE — 74011000250 HC RX REV CODE- 250: Performed by: NURSE ANESTHETIST, CERTIFIED REGISTERED

## 2020-06-24 PROCEDURE — 77030006835 HC BLD SAW SAG STRY -B: Performed by: ORTHOPAEDIC SURGERY

## 2020-06-24 PROCEDURE — 74011250636 HC RX REV CODE- 250/636: Performed by: ORTHOPAEDIC SURGERY

## 2020-06-24 PROCEDURE — 77030029828 HC FEM TIB CKPNT KT DISP STRY -B: Performed by: ORTHOPAEDIC SURGERY

## 2020-06-24 PROCEDURE — 77030029820: Performed by: ORTHOPAEDIC SURGERY

## 2020-06-24 PROCEDURE — 74011250637 HC RX REV CODE- 250/637: Performed by: ANESTHESIOLOGY

## 2020-06-24 PROCEDURE — 74011250636 HC RX REV CODE- 250/636: Performed by: NURSE ANESTHETIST, CERTIFIED REGISTERED

## 2020-06-24 PROCEDURE — 77030040922 HC BLNKT HYPOTHRM STRY -A

## 2020-06-24 PROCEDURE — 74011000250 HC RX REV CODE- 250

## 2020-06-24 PROCEDURE — C1776 JOINT DEVICE (IMPLANTABLE): HCPCS | Performed by: ORTHOPAEDIC SURGERY

## 2020-06-24 PROCEDURE — 74011000272 HC RX REV CODE- 272: Performed by: ORTHOPAEDIC SURGERY

## 2020-06-24 PROCEDURE — 77030010507 HC ADH SKN DERMBND J&J -B: Performed by: ORTHOPAEDIC SURGERY

## 2020-06-24 PROCEDURE — 77030018836 HC SOL IRR NACL ICUM -A: Performed by: ORTHOPAEDIC SURGERY

## 2020-06-24 PROCEDURE — 74011250637 HC RX REV CODE- 250/637: Performed by: ORTHOPAEDIC SURGERY

## 2020-06-24 PROCEDURE — 76060000066 HC AMB SURG ANES 3 TO 3.5 HR: Performed by: ORTHOPAEDIC SURGERY

## 2020-06-24 PROCEDURE — 77030000032 HC CUF TRNQT ZIMM -B: Performed by: ORTHOPAEDIC SURGERY

## 2020-06-24 PROCEDURE — 74011000258 HC RX REV CODE- 258: Performed by: NURSE ANESTHETIST, CERTIFIED REGISTERED

## 2020-06-24 PROCEDURE — 64447 NJX AA&/STRD FEMORAL NRV IMG: CPT

## 2020-06-24 PROCEDURE — 77030007866 HC KT SPN ANES BBMI -B

## 2020-06-24 PROCEDURE — 76210000034 HC AMBSU PH I REC 0.5 TO 1 HR: Performed by: ORTHOPAEDIC SURGERY

## 2020-06-24 PROCEDURE — C1713 ANCHOR/SCREW BN/BN,TIS/BN: HCPCS | Performed by: ORTHOPAEDIC SURGERY

## 2020-06-24 PROCEDURE — 77030033067 HC SUT PDO STRATFX SPIR J&J -B: Performed by: ORTHOPAEDIC SURGERY

## 2020-06-24 PROCEDURE — 77030002933 HC SUT MCRYL J&J -A: Performed by: ORTHOPAEDIC SURGERY

## 2020-06-24 PROCEDURE — 77030028907 HC WRP KNEE WO BGS SOLM -B

## 2020-06-24 PROCEDURE — 74011250636 HC RX REV CODE- 250/636: Performed by: ANESTHESIOLOGY

## 2020-06-24 PROCEDURE — 77030011992 HC AIRWY NASOPHGL TELE -A: Performed by: NURSE ANESTHETIST, CERTIFIED REGISTERED

## 2020-06-24 PROCEDURE — 76030000023 HC AMB SURG 3 TO 3.5 HR INTENSV-TIER 1: Performed by: ORTHOPAEDIC SURGERY

## 2020-06-24 PROCEDURE — 77030031139 HC SUT VCRL2 J&J -A: Performed by: ORTHOPAEDIC SURGERY

## 2020-06-24 DEVICE — TIBIAL COMPONENT
Type: IMPLANTABLE DEVICE | Site: KNEE | Status: FUNCTIONAL
Brand: TRIATHLON

## 2020-06-24 DEVICE — KNEE K2 TOT HEMI ADV CMTLS -- IMPL CAPPED K2: Type: IMPLANTABLE DEVICE | Status: FUNCTIONAL

## 2020-06-24 DEVICE — CRUCIATE RETAINING FEMORAL
Type: IMPLANTABLE DEVICE | Site: KNEE | Status: FUNCTIONAL
Brand: TRIATHLON

## 2020-06-24 DEVICE — PATELLA
Type: IMPLANTABLE DEVICE | Site: KNEE | Status: FUNCTIONAL
Brand: TRIATHLON

## 2020-06-24 DEVICE — TIBIAL BEARING INSERT - CS
Type: IMPLANTABLE DEVICE | Site: KNEE | Status: FUNCTIONAL
Brand: TRIATHLON

## 2020-06-24 RX ORDER — SODIUM CHLORIDE, SODIUM LACTATE, POTASSIUM CHLORIDE, CALCIUM CHLORIDE 600; 310; 30; 20 MG/100ML; MG/100ML; MG/100ML; MG/100ML
100 INJECTION, SOLUTION INTRAVENOUS CONTINUOUS
Status: DISCONTINUED | OUTPATIENT
Start: 2020-06-24 | End: 2020-06-24 | Stop reason: HOSPADM

## 2020-06-24 RX ORDER — OXYCODONE HYDROCHLORIDE 5 MG/1
5 TABLET ORAL
Status: DISCONTINUED | OUTPATIENT
Start: 2020-06-24 | End: 2020-06-25 | Stop reason: HOSPADM

## 2020-06-24 RX ORDER — FENTANYL CITRATE 50 UG/ML
25 INJECTION, SOLUTION INTRAMUSCULAR; INTRAVENOUS
Status: DISCONTINUED | OUTPATIENT
Start: 2020-06-24 | End: 2020-06-24 | Stop reason: HOSPADM

## 2020-06-24 RX ORDER — TRANEXAMIC ACID 100 MG/ML
INJECTION, SOLUTION INTRAVENOUS AS NEEDED
Status: DISCONTINUED | OUTPATIENT
Start: 2020-06-24 | End: 2020-06-24 | Stop reason: HOSPADM

## 2020-06-24 RX ORDER — PROPOFOL 10 MG/ML
INJECTION, EMULSION INTRAVENOUS
Status: DISCONTINUED | OUTPATIENT
Start: 2020-06-24 | End: 2020-06-24 | Stop reason: HOSPADM

## 2020-06-24 RX ORDER — SODIUM CHLORIDE, SODIUM LACTATE, POTASSIUM CHLORIDE, CALCIUM CHLORIDE 600; 310; 30; 20 MG/100ML; MG/100ML; MG/100ML; MG/100ML
125 INJECTION, SOLUTION INTRAVENOUS CONTINUOUS
Status: DISCONTINUED | OUTPATIENT
Start: 2020-06-24 | End: 2020-06-24 | Stop reason: HOSPADM

## 2020-06-24 RX ORDER — ACETAMINOPHEN 325 MG/1
975 TABLET ORAL ONCE
Status: COMPLETED | OUTPATIENT
Start: 2020-06-24 | End: 2020-06-24

## 2020-06-24 RX ORDER — VANCOMYCIN HYDROCHLORIDE 1 G/20ML
INJECTION, POWDER, LYOPHILIZED, FOR SOLUTION INTRAVENOUS AS NEEDED
Status: DISCONTINUED | OUTPATIENT
Start: 2020-06-24 | End: 2020-06-24 | Stop reason: HOSPADM

## 2020-06-24 RX ORDER — ONDANSETRON 2 MG/ML
4 INJECTION INTRAMUSCULAR; INTRAVENOUS AS NEEDED
Status: DISCONTINUED | OUTPATIENT
Start: 2020-06-24 | End: 2020-06-24 | Stop reason: HOSPADM

## 2020-06-24 RX ORDER — DEXAMETHASONE SODIUM PHOSPHATE 4 MG/ML
4 INJECTION, SOLUTION INTRA-ARTICULAR; INTRALESIONAL; INTRAMUSCULAR; INTRAVENOUS; SOFT TISSUE
Status: DISCONTINUED | OUTPATIENT
Start: 2020-06-24 | End: 2020-06-25 | Stop reason: HOSPADM

## 2020-06-24 RX ORDER — ACETAMINOPHEN 500 MG
1000 TABLET ORAL EVERY 6 HOURS
Status: DISCONTINUED | OUTPATIENT
Start: 2020-06-24 | End: 2020-06-25 | Stop reason: HOSPADM

## 2020-06-24 RX ORDER — HYDROMORPHONE HYDROCHLORIDE 1 MG/ML
0.5 INJECTION, SOLUTION INTRAMUSCULAR; INTRAVENOUS; SUBCUTANEOUS
Status: DISCONTINUED | OUTPATIENT
Start: 2020-06-24 | End: 2020-06-25 | Stop reason: HOSPADM

## 2020-06-24 RX ORDER — DIPHENHYDRAMINE HYDROCHLORIDE 50 MG/ML
12.5 INJECTION, SOLUTION INTRAMUSCULAR; INTRAVENOUS AS NEEDED
Status: DISCONTINUED | OUTPATIENT
Start: 2020-06-24 | End: 2020-06-24 | Stop reason: HOSPADM

## 2020-06-24 RX ORDER — SODIUM CHLORIDE 0.9 % (FLUSH) 0.9 %
5-40 SYRINGE (ML) INJECTION AS NEEDED
Status: DISCONTINUED | OUTPATIENT
Start: 2020-06-24 | End: 2020-06-24 | Stop reason: HOSPADM

## 2020-06-24 RX ORDER — LIDOCAINE HYDROCHLORIDE 10 MG/ML
0.1 INJECTION, SOLUTION EPIDURAL; INFILTRATION; INTRACAUDAL; PERINEURAL AS NEEDED
Status: DISCONTINUED | OUTPATIENT
Start: 2020-06-24 | End: 2020-06-24 | Stop reason: HOSPADM

## 2020-06-24 RX ORDER — KETOROLAC TROMETHAMINE 30 MG/ML
15 INJECTION, SOLUTION INTRAMUSCULAR; INTRAVENOUS EVERY 6 HOURS
Status: DISCONTINUED | OUTPATIENT
Start: 2020-06-24 | End: 2020-06-25

## 2020-06-24 RX ORDER — NALOXONE HYDROCHLORIDE 0.4 MG/ML
0.4 INJECTION, SOLUTION INTRAMUSCULAR; INTRAVENOUS; SUBCUTANEOUS AS NEEDED
Status: DISCONTINUED | OUTPATIENT
Start: 2020-06-24 | End: 2020-06-25 | Stop reason: HOSPADM

## 2020-06-24 RX ORDER — ASPIRIN 81 MG/1
81 TABLET ORAL 2 TIMES DAILY
Status: DISCONTINUED | OUTPATIENT
Start: 2020-06-24 | End: 2020-06-25 | Stop reason: HOSPADM

## 2020-06-24 RX ORDER — BUPIVACAINE HYDROCHLORIDE 7.5 MG/ML
INJECTION, SOLUTION EPIDURAL; RETROBULBAR AS NEEDED
Status: DISCONTINUED | OUTPATIENT
Start: 2020-06-24 | End: 2020-06-24 | Stop reason: HOSPADM

## 2020-06-24 RX ORDER — SODIUM CHLORIDE 0.9 % (FLUSH) 0.9 %
5-40 SYRINGE (ML) INJECTION EVERY 8 HOURS
Status: DISCONTINUED | OUTPATIENT
Start: 2020-06-24 | End: 2020-06-24 | Stop reason: HOSPADM

## 2020-06-24 RX ORDER — ONDANSETRON 2 MG/ML
4 INJECTION INTRAMUSCULAR; INTRAVENOUS
Status: DISCONTINUED | OUTPATIENT
Start: 2020-06-24 | End: 2020-06-25 | Stop reason: HOSPADM

## 2020-06-24 RX ORDER — EPHEDRINE SULFATE/0.9% NACL/PF 50 MG/5 ML
SYRINGE (ML) INTRAVENOUS AS NEEDED
Status: DISCONTINUED | OUTPATIENT
Start: 2020-06-24 | End: 2020-06-24 | Stop reason: HOSPADM

## 2020-06-24 RX ORDER — HYDROMORPHONE HYDROCHLORIDE 1 MG/ML
.25-1 INJECTION, SOLUTION INTRAMUSCULAR; INTRAVENOUS; SUBCUTANEOUS
Status: DISCONTINUED | OUTPATIENT
Start: 2020-06-24 | End: 2020-06-24 | Stop reason: HOSPADM

## 2020-06-24 RX ORDER — OXYCODONE HYDROCHLORIDE 5 MG/1
10 TABLET ORAL
Status: DISCONTINUED | OUTPATIENT
Start: 2020-06-24 | End: 2020-06-25 | Stop reason: HOSPADM

## 2020-06-24 RX ORDER — SODIUM CHLORIDE, SODIUM LACTATE, POTASSIUM CHLORIDE, CALCIUM CHLORIDE 600; 310; 30; 20 MG/100ML; MG/100ML; MG/100ML; MG/100ML
INJECTION, SOLUTION INTRAVENOUS
Status: DISCONTINUED | OUTPATIENT
Start: 2020-06-24 | End: 2020-06-24 | Stop reason: HOSPADM

## 2020-06-24 RX ORDER — AMOXICILLIN 250 MG
1 CAPSULE ORAL 2 TIMES DAILY
Status: DISCONTINUED | OUTPATIENT
Start: 2020-06-24 | End: 2020-06-25 | Stop reason: HOSPADM

## 2020-06-24 RX ORDER — SODIUM CHLORIDE 0.9 % (FLUSH) 0.9 %
5-40 SYRINGE (ML) INJECTION EVERY 8 HOURS
Status: DISCONTINUED | OUTPATIENT
Start: 2020-06-24 | End: 2020-06-25 | Stop reason: HOSPADM

## 2020-06-24 RX ORDER — FACIAL-BODY WIPES
10 EACH TOPICAL DAILY PRN
Status: DISCONTINUED | OUTPATIENT
Start: 2020-06-26 | End: 2020-06-25 | Stop reason: HOSPADM

## 2020-06-24 RX ORDER — MIDAZOLAM HYDROCHLORIDE 1 MG/ML
INJECTION, SOLUTION INTRAMUSCULAR; INTRAVENOUS AS NEEDED
Status: DISCONTINUED | OUTPATIENT
Start: 2020-06-24 | End: 2020-06-24 | Stop reason: HOSPADM

## 2020-06-24 RX ORDER — FAMOTIDINE 20 MG/1
20 TABLET, FILM COATED ORAL 2 TIMES DAILY
Status: CANCELLED | OUTPATIENT
Start: 2020-06-24

## 2020-06-24 RX ORDER — OXYCODONE HYDROCHLORIDE 5 MG/1
10 TABLET ORAL
Status: DISCONTINUED | OUTPATIENT
Start: 2020-06-24 | End: 2020-06-24 | Stop reason: HOSPADM

## 2020-06-24 RX ORDER — SODIUM CHLORIDE 0.9 % (FLUSH) 0.9 %
5-40 SYRINGE (ML) INJECTION AS NEEDED
Status: DISCONTINUED | OUTPATIENT
Start: 2020-06-24 | End: 2020-06-25 | Stop reason: HOSPADM

## 2020-06-24 RX ORDER — TRAMADOL HYDROCHLORIDE 50 MG/1
50 TABLET ORAL
Status: DISCONTINUED | OUTPATIENT
Start: 2020-06-24 | End: 2020-06-25 | Stop reason: HOSPADM

## 2020-06-24 RX ORDER — ROPIVACAINE HYDROCHLORIDE 5 MG/ML
INJECTION, SOLUTION EPIDURAL; INFILTRATION; PERINEURAL AS NEEDED
Status: DISCONTINUED | OUTPATIENT
Start: 2020-06-24 | End: 2020-06-24 | Stop reason: HOSPADM

## 2020-06-24 RX ORDER — POVIDONE-IODINE 10 %
SOLUTION, NON-ORAL TOPICAL AS NEEDED
Status: DISCONTINUED | OUTPATIENT
Start: 2020-06-24 | End: 2020-06-24 | Stop reason: HOSPADM

## 2020-06-24 RX ORDER — SODIUM CHLORIDE, SODIUM LACTATE, POTASSIUM CHLORIDE, CALCIUM CHLORIDE 600; 310; 30; 20 MG/100ML; MG/100ML; MG/100ML; MG/100ML
75 INJECTION, SOLUTION INTRAVENOUS CONTINUOUS
Status: DISCONTINUED | OUTPATIENT
Start: 2020-06-24 | End: 2020-06-24 | Stop reason: HOSPADM

## 2020-06-24 RX ORDER — OXYCODONE HCL 10 MG/1
10 TABLET, FILM COATED, EXTENDED RELEASE ORAL ONCE
Status: COMPLETED | OUTPATIENT
Start: 2020-06-24 | End: 2020-06-24

## 2020-06-24 RX ORDER — POLYETHYLENE GLYCOL 3350 17 G/17G
17 POWDER, FOR SOLUTION ORAL DAILY
Status: DISCONTINUED | OUTPATIENT
Start: 2020-06-25 | End: 2020-06-25 | Stop reason: HOSPADM

## 2020-06-24 RX ORDER — SODIUM CHLORIDE 9 MG/ML
125 INJECTION, SOLUTION INTRAVENOUS CONTINUOUS
Status: DISCONTINUED | OUTPATIENT
Start: 2020-06-24 | End: 2020-06-25 | Stop reason: HOSPADM

## 2020-06-24 RX ORDER — HYDROXYZINE HYDROCHLORIDE 10 MG/1
10 TABLET, FILM COATED ORAL
Status: DISCONTINUED | OUTPATIENT
Start: 2020-06-24 | End: 2020-06-25 | Stop reason: HOSPADM

## 2020-06-24 RX ORDER — FENTANYL CITRATE 50 UG/ML
INJECTION, SOLUTION INTRAMUSCULAR; INTRAVENOUS AS NEEDED
Status: DISCONTINUED | OUTPATIENT
Start: 2020-06-24 | End: 2020-06-24 | Stop reason: HOSPADM

## 2020-06-24 RX ADMIN — Medication 5 MG: at 14:32

## 2020-06-24 RX ADMIN — BUPIVACAINE HYDROCHLORIDE 2.4 ML: 7.5 INJECTION, SOLUTION EPIDURAL; RETROBULBAR at 13:02

## 2020-06-24 RX ADMIN — ACETAMINOPHEN 1000 MG: 500 TABLET ORAL at 17:54

## 2020-06-24 RX ADMIN — OXYCODONE HYDROCHLORIDE 10 MG: 10 TABLET, FILM COATED, EXTENDED RELEASE ORAL at 11:26

## 2020-06-24 RX ADMIN — TRANEXAMIC ACID 1 G: 100 INJECTION, SOLUTION INTRAVENOUS at 15:31

## 2020-06-24 RX ADMIN — ROPIVACAINE HYDROCHLORIDE 30 ML: 5 INJECTION, SOLUTION EPIDURAL; INFILTRATION; PERINEURAL at 12:51

## 2020-06-24 RX ADMIN — KETOROLAC TROMETHAMINE 15 MG: 30 INJECTION, SOLUTION INTRAMUSCULAR at 21:52

## 2020-06-24 RX ADMIN — SODIUM CHLORIDE, POTASSIUM CHLORIDE, SODIUM LACTATE AND CALCIUM CHLORIDE: 600; 310; 30; 20 INJECTION, SOLUTION INTRAVENOUS at 12:40

## 2020-06-24 RX ADMIN — SODIUM CHLORIDE, SODIUM LACTATE, POTASSIUM CHLORIDE, AND CALCIUM CHLORIDE 75 ML/HR: 600; 310; 30; 20 INJECTION, SOLUTION INTRAVENOUS at 11:20

## 2020-06-24 RX ADMIN — TRANEXAMIC ACID 1 G: 100 INJECTION, SOLUTION INTRAVENOUS at 13:38

## 2020-06-24 RX ADMIN — Medication 5 MG: at 14:02

## 2020-06-24 RX ADMIN — MIDAZOLAM HYDROCHLORIDE 1 MG: 2 INJECTION, SOLUTION INTRAMUSCULAR; INTRAVENOUS at 12:56

## 2020-06-24 RX ADMIN — ASPIRIN 81 MG: 81 TABLET, COATED ORAL at 17:53

## 2020-06-24 RX ADMIN — Medication 5 MG: at 13:29

## 2020-06-24 RX ADMIN — PHENYLEPHRINE HYDROCHLORIDE 50 MCG: 10 INJECTION INTRAVENOUS at 15:27

## 2020-06-24 RX ADMIN — MIDAZOLAM HYDROCHLORIDE 1 MG: 2 INJECTION, SOLUTION INTRAMUSCULAR; INTRAVENOUS at 12:46

## 2020-06-24 RX ADMIN — Medication 5 MG: at 13:22

## 2020-06-24 RX ADMIN — Medication 5 MG: at 13:47

## 2020-06-24 RX ADMIN — DOCUSATE SODIUM 50MG AND SENNOSIDES 8.6MG 1 TABLET: 8.6; 5 TABLET, FILM COATED ORAL at 17:53

## 2020-06-24 RX ADMIN — OXYCODONE 10 MG: 5 TABLET ORAL at 20:16

## 2020-06-24 RX ADMIN — ACETAMINOPHEN 1000 MG: 500 TABLET ORAL at 23:48

## 2020-06-24 RX ADMIN — Medication 5 MG: at 14:40

## 2020-06-24 RX ADMIN — WATER 2 G: 1 INJECTION INTRAMUSCULAR; INTRAVENOUS; SUBCUTANEOUS at 20:06

## 2020-06-24 RX ADMIN — Medication 5 MG: at 13:40

## 2020-06-24 RX ADMIN — FENTANYL CITRATE 50 MCG: 0.05 INJECTION, SOLUTION INTRAMUSCULAR; INTRAVENOUS at 12:46

## 2020-06-24 RX ADMIN — SODIUM CHLORIDE 125 ML/HR: 900 INJECTION, SOLUTION INTRAVENOUS at 17:00

## 2020-06-24 RX ADMIN — SODIUM CHLORIDE, POTASSIUM CHLORIDE, SODIUM LACTATE AND CALCIUM CHLORIDE: 600; 310; 30; 20 INJECTION, SOLUTION INTRAVENOUS at 14:08

## 2020-06-24 RX ADMIN — ACETAMINOPHEN 975 MG: 325 TABLET ORAL at 11:25

## 2020-06-24 RX ADMIN — FENTANYL CITRATE 50 MCG: 0.05 INJECTION, SOLUTION INTRAMUSCULAR; INTRAVENOUS at 12:56

## 2020-06-24 RX ADMIN — Medication 5 MG: at 14:10

## 2020-06-24 RX ADMIN — Medication 5 MG: at 14:20

## 2020-06-24 RX ADMIN — PROPOFOL 100 MCG/KG/MIN: 10 INJECTION, EMULSION INTRAVENOUS at 13:22

## 2020-06-24 RX ADMIN — Medication 5 MG: at 13:35

## 2020-06-24 RX ADMIN — Medication 10 ML: at 20:07

## 2020-06-24 RX ADMIN — CEFAZOLIN SODIUM 2 G: 1 POWDER, FOR SOLUTION INTRAMUSCULAR; INTRAVENOUS at 13:22

## 2020-06-24 RX ADMIN — SODIUM CHLORIDE 125 ML/HR: 900 INJECTION, SOLUTION INTRAVENOUS at 23:42

## 2020-06-24 NOTE — PROGRESS NOTES
POST ANESTHESIA CARE    DISCHARGE / TRANSFER NOTE    Nadira Davis was   transfered   via   Bed   to   hospital room 409. Patient was escorted by    nurse    . Patient verbalized   appreciation and was very pleased with care received    throughout their stay. Patient was discharged in     pleasant mood  . Pain at discharge/transfer was       0   /10. Discharge, medication and follow-up instructions were verbalized as understood prior to discharge  (if applicable for same-day procedures being discharged.)    All personal belongings have been returned to patient, and patient/family verbally confirm receiving belongings as all present. TRANSFER - OUT REPORT:    VERBAL-BEDSIDE  report  WAS     given at 5:38 PM to   Thomas Addison   receiving   Nadira Davis   and being transferred to   [facility]    for routine post - op  Following Spinal for Procedure(s) (LRB):  RIGHT TOTAL KNEE ARTHROPLASTY MAKOPLASTY (SPINAL WITH REGIONAL) (Right) by  Alma Bar MD.    Patient Situation, Background, Assessment and Recommendations (SBAR) was provided and included information from the following SBAR report(s) (SBAR, OR Summary and MAR) which were reviewed with the receiving nurse. Lines:   Peripheral IV 06/24/20 Left Hand (Active)   Site Assessment Clean, dry, & intact 6/24/2020  4:25 PM   Phlebitis Assessment 0 6/24/2020  4:25 PM   Infiltration Assessment 0 6/24/2020  4:25 PM   Dressing Status Clean, dry, & intact 6/24/2020  4:25 PM   Dressing Type Transparent;Tape 6/24/2020  4:25 PM   Hub Color/Line Status Patent; Infusing 6/24/2020  4:25 PM   Alcohol Cap Used Yes 6/24/2020 11:18 AM      Also Reviewed (checked if applicable):   [] NO ADDITIONAL REVIEWS  [] PCA Drug and Settings     [x] Cold Therapy with extra gels     [] On-Q @  ml/hr   [] Drains x       [] Significant Wound care/devices (e.g. Wound vac, etc.)     [] Holding pt in PACU awaiting bed availability - additional care provided and eMAR review  [] Vent Settings      [] Critical Care Drips  Contact Precautions: There are currently no Active Isolations  Patient transported with:  Registered Nurse    Additional Information: Interval bedside SBAR report was completed. There were:  [x] No changes to patient condition since last communication. [] Changes to patient care//condition since last communication with receiving RN and were reviewed at        verbal bedside SBAR change of staff. Patient was in:   [x] No Acute Distress     [] Mild/Moderate Acute discomfort - treated and controlled  [] Acute dicomfort/distress - treated but still not fully controlled  [] Acute dicomfort/distress reported, however maximum allowable dosing has been achieved and no further        doses allowed    Vital Signs are: [x] Stable - consistent with end of PACU care. [] Unstable -  receiving continued care in appropriate setting    [x] 2 RN skin check completed with receiving RN. [x] Spouse at bedside during/after staff handoff of care. After report, opportunity for questions and clarification was provided.     Signed: Constantino HOLDER RN-BC

## 2020-06-24 NOTE — ANESTHESIA PROCEDURE NOTES
Spinal Block    Start time: 6/24/2020 12:56 PM  End time: 6/24/2020 1:01 PM  Performed by: Adriana Boxer, MD  Authorized by: Baljeet Morales DO     Pre-procedure:   Indications: at surgeon's request and primary anesthetic  Preanesthetic Checklist: patient identified, risks and benefits discussed, anesthesia consent, site marked, patient being monitored and timeout performed    Timeout Time: 12:56          Spinal Block:   Patient Position:  Seated  Prep Region:  Lumbar  Prep: DuraPrep      Location:  L3-4  Technique:  Single shot        Needle:   Needle Type:  Pencan  Needle Gauge:  25 G  Attempts:  1      Events: CSF confirmed, no blood with aspiration and no paresthesia        Assessment:  Insertion:  Uncomplicated  Patient tolerance:  Patient tolerated the procedure well with no immediate complications

## 2020-06-24 NOTE — OP NOTES
OPERATIVE REPORT     Admit Date: 6/24/2020  Admit Diagnosis: Primary osteoarthritis of right knee [M17.11]  Primary localized osteoarthritis of right knee [M17.11]    Date of Procedure: 6/24/2020   Preoperative Diagnosis: Primary osteoarthritis of right knee [M17.11]  Postoperative Diagnosis: Primary osteoarthritis of right knee [M17.11]    Procedure: RIGHT TOTAL KNEE ARTHROPLASTY MAKOPLASTY (SPINAL WITH REGIONAL)  Surgeon: Suzanne Oconnor MD  Assistant(s): [facility]  Anesthesia: Spinal   Antibiotic: ANCEF 2 g  Estimated Blood Loss:250  Tourniquet Time: 103 @ 250 mmHg   Specimens: * No specimens in log *   Complications: None         Implants Utilized:  Plato Triathlon Cruciate Retaining Femoral Cementless Beaded Component, size #8 CR RT, Plato Triathlon Tritanium Tibial Baseplate, size #7, Plato Triathlon X3 Tibial Bearing Insert - CS, size #7x9mm, Plato Triathlon X3 Asymmetric Metal Backed Patella size [id number]. INDICATIONS:   The patient is a 71 y.o., male who has complained of a long history of right knee pain. The patient  has failed conservative treatment and presents for definitive operative care. Informed consent obtained including a discussion of the risks and benefits, which include, but are not limited to, bleeding, infection, neurovascular damage, wound complications, pain and stiffness in the knee, incomplete relief of symptoms or dissatisfaction with surgery, periprosthetic loosening, fracture, dislocation and DVT, the patient consented for the procedure. DESCRIPTION OF PROCEDURE:           The patient was seen in the preoperative holding area. The patient was positively identified. The limb was initialed,  questions were answered. The patient was subsequently taken to the operating room. The patient underwent spinal anesthesia. The patient was positioned in the supine position. All bony prominences were well padded. The limb was prepped and draped in a sterile fashion. The appropriate pause for safety was performed. After adequate anesthesia, the correct lower extremity was prepped and draped in sterile fashion. The patient was positioned supine on the operating room table. Once positioned a formal time-out was performed that identified the operative extremity. Before proceeding with surgery we ensured all implants and instruments were available and preoperative imaging/templating was available and in the room which confirmed the operative extremity. A mid-line parapatellar incision was used along with medial arthrotomy. Soft tissue were removed from the patellar fat pad and notch. The MCL was mobilized and the tibia subluxed. Patellar preparation was completed to include removal of the fat pad, patellar cut and holes were drilled for the patella. Next we proceeded with placement of our femoral and tibial arrays. The femoral array was placed outside of our incision 1 handbreadth proximal to the patella. Poke hole incisions were made through the skin and blunt dissection performed down to the level of the femoral shaft. The tibial array was placed in a similar fashion 1 handbreadth below the joint line with poke hole incisions just off the crest of the tibia. Arrays were confirmed to be in appropriate position and tightened. The medial-based soft tissue was then retracted as well as well as the lateral tissue. The femoral and tibial trackers were placed. The hip center or rotation was established. Registration was performed on the femur and tibia. Osteophytes were removed. The knee was balanced in both flexion and extension with force applied. The computer model of implants and position was verified. Once this was complete the MAKOplasty robot was positioned. Standard cuts were made for the distal femur and posterior chamfer first. The blade was changed and then the remaining femoral and then tibial cuts were made.   We then removed our bony resections and excess bone. Then using a lamina  we removed any posterior osteophytes from the medial and lateral recess along with any residual soft tissue or meniscus. Trial implants were placed and range of motion along with overall fit was established with very good integrity of the MCL noted. Once happy with the trial the tibial preparation was completed for Press-Fit fixation. Tibial baseplate placement and keel preparation were performed along with drill holes for the tibial baseplate. We then inspected throughout the knee for any areas of sclerotic bone and any areas of sclerotic bone were further drilled and prepared for cementless fixation. The naomy pins and trackers were removed and accounted for prior to closure. All the bony surfaces were irrigated. The tibia was placed first, then the poly, residual osteophytes were removed and then the femur. The patella was then placed, all bony surfaces were verified. The knee was then soaked with dilute betadine solution and then irrigated once more time. The knee was very stable and it was taken through a full range of motion. The capsulotomy was closed with interrupted #1 Vicryls and #2 Stratafix suture in a running fashion after placing vancomycin powder in the wound and obtaining hemostasis. The sub-cutaneous tissue was closed with 2-0 Vicryl in subcutaneous tissue and running 3-0 monocryl Stratafix in the dermis with adhesive glue on the skin. A sterile dressing was applied. The patient was awoken from anesthesia and taken to the recovery room in a stable condiition.      OPERATIVE FINDINGS : lateral tracking of the patella and tricompartmental OA      Antonio El MD

## 2020-06-24 NOTE — ANESTHESIA POSTPROCEDURE EVALUATION
Procedure(s):  RIGHT TOTAL KNEE ARTHROPLASTY MAKOPLASTY (SPINAL WITH REGIONAL). spinal, regional    Anesthesia Post Evaluation      Multimodal analgesia: multimodal analgesia not used between 6 hours prior to anesthesia start to PACU discharge  Patient location during evaluation: PACU  Patient participation: complete - patient participated  Level of consciousness: awake and alert  Pain score: 0  Pain management: adequate  Airway patency: patent  Anesthetic complications: no  Cardiovascular status: hemodynamically stable and acceptable  Respiratory status: acceptable  Hydration status: acceptable  Comments: Patient seen and evaluated; no concerns. Spinal regressing: at T12/L1  Post anesthesia nausea and vomiting:  none      INITIAL Post-op Vital signs:   Vitals Value Taken Time   /69 6/24/2020  4:35 PM   Temp 36.4 °C (97.5 °F) 6/24/2020  4:26 PM   Pulse 71 6/24/2020  4:35 PM   Resp 11 6/24/2020  4:35 PM   SpO2 97 % 6/24/2020  4:35 PM   Vitals shown include unvalidated device data.

## 2020-06-24 NOTE — INTERVAL H&P NOTE
Update History & Physical 
 
The Patient's History and Physical of June 15th, 2020  was reviewed with the patient and I examined the patient. There was no change. The surgical site was confirmed by the patient and me. Plan:  The risk, benefits, expected outcome, and alternative to the recommended procedure have been discussed with the patient. Patient understands and wants to proceed with the procedure.  
 
Electronically signed by Casey Dejseus MD on 6/24/2020 at 12:53 PM

## 2020-06-24 NOTE — ANESTHESIA PREPROCEDURE EVALUATION
Relevant Problems   No relevant active problems       Anesthetic History   No history of anesthetic complications            Review of Systems / Medical History  Patient summary reviewed, nursing notes reviewed and pertinent labs reviewed    Pulmonary  Within defined limits                 Neuro/Psych   Within defined limits           Cardiovascular    Hypertension: well controlled          Hyperlipidemia         GI/Hepatic/Renal         Renal disease: stones       Endo/Other        Obesity and arthritis     Other Findings              Physical Exam    Airway  Mallampati: I    Neck ROM: normal range of motion   Mouth opening: Normal     Cardiovascular    Rhythm: regular  Rate: normal         Dental  No notable dental hx       Pulmonary  Breath sounds clear to auscultation               Abdominal         Other Findings            Anesthetic Plan    ASA: 2  Anesthesia type: spinal and regional - femoral single shot      Post-op pain plan if not by surgeon: peripheral nerve block single      Anesthetic plan and risks discussed with: Patient      Informed consent obtained.

## 2020-06-24 NOTE — ANESTHESIA PROCEDURE NOTES
Peripheral Block    Start time: 6/24/2020 12:46 PM  End time: 6/24/2020 12:51 PM  Performed by: Allen Head DO  Authorized by: Allen Head DO       Pre-procedure: Indications: at surgeon's request and post-op pain management    Preanesthetic Checklist: patient identified, risks and benefits discussed, site marked, timeout performed, anesthesia consent given and patient being monitored    Timeout Time: 12:46          Block Type:   Block Type:   Adductor canal  Laterality:  Right  Monitoring:  Continuous pulse ox, frequent vital sign checks, heart rate and responsive to questions  Injection Technique:  Single shot  Procedures: ultrasound guided    Patient Position: supine  Prep: chlorhexidine    Location:  Mid thigh  Needle Type:  Stimuplex  Needle Gauge:  21 G  Needle Localization:  Ultrasound guidance    Assessment:  Number of attempts:  1  Injection Assessment:  Incremental injection every 5 mL, local visualized surrounding nerve on ultrasound, negative aspiration for blood, no paresthesia and no intravascular symptoms  Patient tolerance:  Patient tolerated the procedure well with no immediate complications

## 2020-06-24 NOTE — PROGRESS NOTES
4th floor Charge RN Clem White  RN)  notified of admission and awaiting receiving ( tba  RN) call-back for room 409.

## 2020-06-24 NOTE — PERIOP NOTES
PACU IN REPORT FROM ANESTHESIA    Verbal report received from   Lou Tesfaye   [] MD/DO-Anesthesiologist    [x] CRNA   [] with student    CHOICE ANESTHESIA:  [] GENERAL  [x] MAC  [] LOCAL  [x] REGIONAL  [x] SPINAL   [] EPIDURAL   **Note the anesthesia record for medications given intraoperatively. **           [] ERAS PROTOCOL    SURGICAL PROCEDURE: Procedure(s) (LRB):  RIGHT TOTAL KNEE ARTHROPLASTY MAKOPLASTY (SPINAL WITH REGIONAL) (Right)     SURGEON: Catalina Starkey MD.    Brief Initial Visual Assessment:    Patient Age: [] Infant(1-12mo)      []Pediatric(1-13yrs)    [] Adolescent(13-18yrs)    [] Adult(18-65yrs)      [x]Geriatric Adult(>65yrs). Patient    [x] Alert           [x]Calm & Cooperative      [] Anxious  Appearance: [x] Drowsy      [] Sedated      [] Unresponsive     Oriented x  3              Airway:     [x] Patent                          [] Near-obstructed   [] Obstructed                        [] Airway improved with head/airway repositioning                       Airway Adjuncts Present: [] Oral Airway    [] Nasal Trumpet    [] ETT    [] LMA            Respiratory  [x] Even   [] Labored   [] Shallow   [] Tachypnea   [] Bradypnea  Pattern:    [x] Non-Labored  [] VENT and/or respiratory assistance     being provided. Skin:     [x] Pink [] Dusky    [] Pale        [x] Warm    [] Hot [] Cool       [] Cold   [x]Dry [] Moist [] Diaphoretic     Membranes:  [x] Pink [] Pale       [x] Moist [] Dry     [] Crusty     Pain:   [x] No Acute Discomfort. 0   /10 Scale [x] Verbal Numeric   [] Moderate Discomfort.      [] V.A.S. [] Acute Discomfort.                [] A.N.V.    [] Chronic-Issue Related Discomfort.   [] F.L.A.C.C. Note E-MAR for medications administered. []Faces, Powers/Baker    Note assessments documented in flowsheets; any assessment variants to be found in comments or narrative perioperative nurse notes.        Post-anesthesia care now assumed by [facility] BSN, RN-BC

## 2020-06-25 VITALS
HEIGHT: 74 IN | WEIGHT: 259.04 LBS | RESPIRATION RATE: 16 BRPM | OXYGEN SATURATION: 97 % | HEART RATE: 61 BPM | BODY MASS INDEX: 33.24 KG/M2 | TEMPERATURE: 97.9 F | SYSTOLIC BLOOD PRESSURE: 109 MMHG | DIASTOLIC BLOOD PRESSURE: 65 MMHG

## 2020-06-25 LAB
ANION GAP SERPL CALC-SCNC: 5 MMOL/L (ref 5–15)
BUN SERPL-MCNC: 25 MG/DL (ref 6–20)
BUN/CREAT SERPL: 19 (ref 12–20)
CALCIUM SERPL-MCNC: 8.1 MG/DL (ref 8.5–10.1)
CHLORIDE SERPL-SCNC: 105 MMOL/L (ref 97–108)
CO2 SERPL-SCNC: 30 MMOL/L (ref 21–32)
CREAT SERPL-MCNC: 1.35 MG/DL (ref 0.7–1.3)
GLUCOSE SERPL-MCNC: 106 MG/DL (ref 65–100)
HGB BLD-MCNC: 10.7 G/DL (ref 12.1–17)
POTASSIUM SERPL-SCNC: 3.2 MMOL/L (ref 3.5–5.1)
SODIUM SERPL-SCNC: 140 MMOL/L (ref 136–145)

## 2020-06-25 PROCEDURE — 97116 GAIT TRAINING THERAPY: CPT

## 2020-06-25 PROCEDURE — 74011250637 HC RX REV CODE- 250/637: Performed by: ORTHOPAEDIC SURGERY

## 2020-06-25 PROCEDURE — 74011250636 HC RX REV CODE- 250/636: Performed by: ORTHOPAEDIC SURGERY

## 2020-06-25 PROCEDURE — 85018 HEMOGLOBIN: CPT

## 2020-06-25 PROCEDURE — 80048 BASIC METABOLIC PNL TOTAL CA: CPT

## 2020-06-25 PROCEDURE — 36415 COLL VENOUS BLD VENIPUNCTURE: CPT

## 2020-06-25 PROCEDURE — 97530 THERAPEUTIC ACTIVITIES: CPT

## 2020-06-25 PROCEDURE — 94760 N-INVAS EAR/PLS OXIMETRY 1: CPT

## 2020-06-25 PROCEDURE — 74011000250 HC RX REV CODE- 250: Performed by: ORTHOPAEDIC SURGERY

## 2020-06-25 PROCEDURE — 99218 HC RM OBSERVATION: CPT

## 2020-06-25 PROCEDURE — 97161 PT EVAL LOW COMPLEX 20 MIN: CPT

## 2020-06-25 RX ORDER — ASPIRIN 81 MG/1
81 TABLET ORAL 2 TIMES DAILY
Qty: 60 TAB | Refills: 0 | Status: SHIPPED | OUTPATIENT
Start: 2020-06-25 | End: 2020-07-25

## 2020-06-25 RX ORDER — TRAMADOL HYDROCHLORIDE 50 MG/1
50 TABLET ORAL
Qty: 40 TAB | Refills: 0 | Status: SHIPPED | OUTPATIENT
Start: 2020-06-25 | End: 2020-07-05

## 2020-06-25 RX ORDER — CELECOXIB 200 MG/1
200 CAPSULE ORAL DAILY
Qty: 30 CAP | Refills: 0 | Status: SHIPPED | OUTPATIENT
Start: 2020-06-25 | End: 2020-07-25

## 2020-06-25 RX ORDER — OXYCODONE HYDROCHLORIDE 5 MG/1
5-10 TABLET ORAL
Qty: 50 TAB | Refills: 0 | Status: SHIPPED | OUTPATIENT
Start: 2020-06-25 | End: 2020-06-28

## 2020-06-25 RX ORDER — NALOXONE HYDROCHLORIDE 4 MG/.1ML
SPRAY NASAL
Qty: 1 EACH | Refills: 0 | Status: SHIPPED | OUTPATIENT
Start: 2020-06-25 | End: 2020-08-31 | Stop reason: ALTCHOICE

## 2020-06-25 RX ADMIN — POLYETHYLENE GLYCOL 3350 17 G: 17 POWDER, FOR SOLUTION ORAL at 09:28

## 2020-06-25 RX ADMIN — ASPIRIN 81 MG: 81 TABLET, COATED ORAL at 09:28

## 2020-06-25 RX ADMIN — OXYCODONE 10 MG: 5 TABLET ORAL at 12:40

## 2020-06-25 RX ADMIN — WATER 2 G: 1 INJECTION INTRAMUSCULAR; INTRAVENOUS; SUBCUTANEOUS at 03:20

## 2020-06-25 RX ADMIN — DOCUSATE SODIUM 50MG AND SENNOSIDES 8.6MG 1 TABLET: 8.6; 5 TABLET, FILM COATED ORAL at 09:28

## 2020-06-25 RX ADMIN — OXYCODONE 10 MG: 5 TABLET ORAL at 03:20

## 2020-06-25 RX ADMIN — KETOROLAC TROMETHAMINE 15 MG: 30 INJECTION, SOLUTION INTRAMUSCULAR at 05:29

## 2020-06-25 RX ADMIN — Medication 10 ML: at 05:31

## 2020-06-25 RX ADMIN — ACETAMINOPHEN 1000 MG: 500 TABLET ORAL at 12:39

## 2020-06-25 RX ADMIN — WATER 2 G: 1 INJECTION INTRAMUSCULAR; INTRAVENOUS; SUBCUTANEOUS at 12:39

## 2020-06-25 RX ADMIN — ACETAMINOPHEN 1000 MG: 500 TABLET ORAL at 05:30

## 2020-06-25 RX ADMIN — OXYCODONE 5 MG: 5 TABLET ORAL at 09:28

## 2020-06-25 NOTE — PROGRESS NOTES
Bedside and Verbal shift change report given to aRyo Logan RN (oncoming nurse) by Nakia So RN (offgoing nurse). Report included the following information SBAR, Kardex, OR Summary, Procedure Summary, Intake/Output, MAR and Recent Results.

## 2020-06-25 NOTE — PROGRESS NOTES
6/25/2020  10:58 AM  Reason for Admission: Elective - POA of right knee requiring Surgery    Assessment:   []In person with pt   [x]Via p/c with pt   []With family member in person. Who/Relation:     []With family member via p/c. Who/Relation:   []Chart Review    RUR:  NA - OBS  Risk Level: [x]Low []Moderate []High  Value-based purchasing: [] Yes [x] No  Bundle patient: [] Yes [x] No   Specify:     Advance Directive: Full Code. Yes, no copy on file. Copy requested. Assessment:    Age: 71    Sex: [x] Male []Female     Residency: [x]Private residence []Apartment []Assisted Living []LTC []Other:   Exterior Steps: 3  Interior Steps: 1 flight     Lives With: [x]With spouse []Other family members []Underage children []Alone []Care provider []Other:    Prior functioning:  [x]Independent []Dependent []Partial dependence   Pt requires assistance with: []Bathing []Dressing []Toileting []Ambulation     Prior DME required:  [x]None []RW []Cane []Crutches []Bedside commode []CPAP []Home O2 (Liter/Provider: ) []Nebulizer   []Shower Chair []Wheelchair []Hospital Bed []Nayely []Stair lift []Rollator []Other:    DME available: []None [x]RW []Cane []Crutches []Bedside commode []CPAP []Home O2 (Liter/Provider: ) []Nebulizer   []Shower Chair []Wheelchair []Hospital Bed []Nayely []Stair lift []Rollator []Other:    Rehab history: []None [x]Outpatient PT []Home Health (Provider/Date: ) []SNF (Provider/Date: ) []IPR (Provider/Date: ) []LTC (Provider/Date: )    Discharge Concerns: []Yes [x]No []Unknown   Describe: Insurer:  Medicare / Elijah Wheatley    PCP: Carrie Obregon MD   Name of Practice: [facility] [place]   Current patient: [x]Yes []No   Approximate date of last visit: 8/28/19   Access to virtual PCP visits: []Yes [x]No    Pharmacy:  [id number] [facility] Transport:  Family        Transition of care plan:    []Unable to determine at this time. Awaiting clinical progress, and disposition recommendations.     [] Home with outpatient follow-up    [x] Home with Outpatient PT and outpatient follow-up   Pt aware of OP appt? [x]Yes, Provider: Trevor Villareal   []Not scheduled   Transport provider: Family    [] Home with family assistance as needed and outpatient follow-up   Family able to assist:    Schedule:  Transport provider:      [] Home with Home Health   - Provider:     []SNF/IPR   -[]Preferences given:   []Listing provided and preferences requested   -Status: []Pending []Accepted:    -Auth required: []Yes []No    -Auth initiated date:   -3 midnight stay required: []Yes []No  Date satisfied:     [] Other:     Kala Rodriguez MA    Care Management Interventions  PCP Verified by CM: Yes([place])  Mode of Transport at Discharge:  Other (see comment)(Family)  Transition of Care Consult (CM Consult): Discharge Planning  MyChart Signup: No  Discharge Durable Medical Equipment: No  Physical Therapy Consult: Yes  Occupational Therapy Consult: Yes  Speech Therapy Consult: No  Current Support Network: Lives with Spouse  Confirm Follow Up Transport: Family  Discharge Location  Discharge Placement: Home with outpatient services

## 2020-06-25 NOTE — PROGRESS NOTES
Orthopaedic Progress Note  Post Op day: 1 Day Post-Op    2020 12:04 PM     Patient: Dorthula Gaucher MRN: [id number]  SSN: [id number]    YOB: 1950  Age: 71 y.o. Sex: male      Admit date:  2020  Date of Surgery:  2020   Procedures:  Procedure(s):  RIGHT TOTAL KNEE ARTHROPLASTY MAKOPLASTY (SPINAL WITH REGIONAL)  Admitting Physician:  Marta Long MD   Surgeon:  Kathleen Cotto) and Role:     Candance Fore, MD - Primary    Consulting Physician(s): Treatment Team: Attending Provider: Elisabeth Baker MD; Utilization Review: Lynda Hickman; Care Manager: Chase Reed    SUBJECTIVE:     Dorthula Gaucher is a 71 y.o. male is 1 Day Post-Op s/p Procedure(s):  RIGHT TOTAL KNEE ARTHROPLASTY MAKOPLASTY (SPINAL WITH REGIONAL) with an appropriate level of post-operative pain. Patient resting comfortable in bed with his wife at the bedside. No complaints of nausea, vomiting, dizziness, lightheadedness, chest pain, or shortness of breath. OBJECTIVE:       Physical Exam:  General: Alert, cooperative, no distress. Respiratory: Respirations unlabored  Neurological:  Neurovascular exam within normal limits. Motor: + DF/PF. Dressing/Wound:  OptiFoam dressing is clean, dry and intact. No significant erythema or swelling.       Vital Signs:        Patient Vitals for the past 8 hrs:   BP Temp Pulse Resp SpO2   20 1143 109/65 97.9 °F (36.6 °C) 61 16 97 %   20 0810 93/65 98.1 °F (36.7 °C) 63 16 96 %                                          Temp (24hrs), Av.7 °F (36.5 °C), Min:97.5 °F (36.4 °C), Max:98.1 °F (36.7 °C)      Labs:        Recent Labs     20  0414   HGB 10.7*     Lab Results   Component Value Date/Time    Sodium 140 2020 04:14 AM    Potassium 3.2 (L) 2020 04:14 AM    Chloride 105 2020 04:14 AM    CO2 30 2020 04:14 AM    Glucose 106 (H) 2020 04:14 AM    BUN 25 (H) 2020 04:14 AM    Creatinine 1.35 (H) 06/25/2020 04:14 AM    Calcium 8.1 (L) 06/25/2020 04:14 AM       PT/OT:        Gait  Base of Support: Widened  Speed/Eliana: Pace decreased (<100 feet/min)  Gait Abnormalities: Antalgic  Ambulation - Level of Assistance: Supervision  Distance (ft): 180 Feet (ft)  Assistive Device: Gait belt, Walker, rolling  Rail Use: (both, right and none)  Stairs - Level of Assistance: Supervision  Number of Stairs Trained: 12       Patient mobility  Bed Mobility Training  Rolling: Independent  Supine to Sit: Independent  Sit to Supine: Independent  Transfer Training  Sit to Stand: Independent  Stand to Sit: Independent  Bed to Chair: Independent      Gait Training  Assistive Device: Gait belt, Walker, rolling  Ambulation - Level of Assistance: Supervision  Distance (ft): 180 Feet (ft)  Stairs - Level of Assistance: Supervision  Number of Stairs Trained: 12  Rail Use: (both, right and none)   Weight Bearing Status  Right Side Weight Bearing: As tolerated        ASSESSMENT / PLAN:   Active Problems:    Primary localized osteoarthritis of right knee (6/24/2020)         S/P RIGHT TOTAL KNEE ARTHROPLASTY MAKOPLASTY (SPINAL WITH REGIONAL) Patient has cleared PT. DVT Prophylaxis ASA 81mg BID, SCDs   Pain Continue current regimen   Discharge Disposition Discharge home today. Patient has an OP PT visit and a follow-up appointment with Dr. Abdullahi Pena scheduled.      Signed By: Karina Rosenthal NP    RN, MSN, FNP-BC  Orthopedic Nurse Practitioner  [facility]

## 2020-06-25 NOTE — PROGRESS NOTES
Dr Edson Vail called to check on patient and due to patients pain he added toradol 15 mg q 6.  Pt is also on Aspirin so I called and spoke to [facility] [place] and he verified medication would be safe to give after also verifying Cr level of 1.23

## 2020-06-25 NOTE — DISCHARGE INSTRUCTIONS
TOTAL JOINT REPLACEMENT POST OPERATIVE INSTRUCTIONS   Follow-Up Appointment:  o You should already be scheduled for a 2-week follow-up appointment, but if you are unsure about your follow-up date, please call our office at [phone]. o If you do not have this appointment, it should be scheduled 2 weeks from the date of your surgery     Activity:  o Unless you have been specifically instructed otherwise, you can advance your activity as tolerated.   o You have no hip precautions. Be sure any physical therapist who is working with you understands this and encourage them to call my office with any questions.   o You may also bear weight fully on your hip with a walker and transition to a cane or crutch as soon as you feel comfortable and strong enough. That being said, advance your activity in a stepwise and cautious manner. You will likely feel better than your hip is ready for.    o Please refrain from any high level activities until we see you back at your follow up appointment. This includes golfing, exercising, and other more intense activities. o Perform your exercises as often as possible, at least 2 times a day.  o Application of the ice packs will prevent and treat inflammation and reduce pain and swelling. You should ice the incisional area at least 3 times a day, 20-30 minutes at a time.  Dressings / Wound Care:  o Your waterproof dressing should be removed between 7-10 days from surgery. You can do this yourself or your home therapy personnel can do it for you.  o Dermabond (skin glue) may be used to help close the incision, which appears as a thin, transparent covering over the incision. Do not pick or pull at this covering, this will fall off naturally within 2-3 weeks. o Do not apply antibiotic ointment to your incisions. o Once your waterproof dressing has been removed, you may change bandages daily if you like or leave open to air.   These can be purchased at your local pharmacy. o Most incisions are closed with absorbable sutures but if not the sutures or staples will be removed at your 2 week follow up.  Showering / Bathing:  o If your incision is dry without drainage you may shower following your discharge home. The dressing is waterproof as long as well affixed to skin.  o You may shower with the waterproof dressing in place, but dry dressings should be removed before showering.   o It is fine to have water run over the incision after the waterproof dressing has been removed. o Do not vigorously scrub your incision. o Do not take a bath or get into a swimming pool / Zaira Drop until you follow up with Dr. Arron Maldonado. o Do not soak your incision under water for 6 weeks. o If there is continued drainage or you are concerned contact Dr. Chandler Lopez office prior to showering [phone]. [phone]     Diet:  o You may advance to your regular diet as tolerated. o Proper nutrition is crucial to healing. Make sure you are eating as healthy as possible and following a balanced diet after your surgery. o Avoid processed foods and eat mainly fruits and vegetables.  Medications:  o It is our goal to keep you as comfortable as possible after your surgery. Please take your medications as prescribed without exceeding the recommended dosage. o It is also important to understand that pain has a cycle. It begins and increases until medication interrupts it. The aim of good pain control is to stop the pain before it becomes intolerable. The key is to stay ahead of the pain.  o We encourage patients to discontinue pain medications as soon as possible after surgery. o The side effects of these medications can be substantial and the narcotic medications are not mandatory. You may substitute a prescribed narcotic with over-the-counter Tylenol.  o We along with your narcotic will likely give you two other pain medications Celebrex and tramadol.   o Pain medications may cause constipation- Colace twice daily and Miralax while taking the narcotic medication should help prevent constipation. o Other possible side effects of pain medication include dizziness, headache, nausea, vomiting, and urinary retention. o Discontinue the pain medication if you develop itching, rash, shortness of breath, or difficulties swallowing. If these symptoms become severe or are not relieved by discontinuing the medication, you should seek immediate medical attention. o Refills of pain medication are authorized during office hours only (8 AM- 5 PM  Monday through Friday). Our office will not prescribe narcotics beyond 6 weeks from the date of your surgery. o Narcotics will not be called into the pharmacy and, in most cases, you must be seen clinically.  Blood Thinner:  o You will be given a prescription for either Aspirin or Xarelto based on your health history.  o You should take these medications as prescribed for 30 days following your surgery and then an 81mg for 2 additional months if you don't already take one.  Driving:  o You should not return to driving until you are off all narcotic pain medications and able to safely and quickly apply the brakes. This is normally 2-4 weeks for left sided joint replacements and 2-6 weeks for right-sided joint replacements.  Airports and metal detectors  o ID cards used to be routinely given after joint replacement, however they do not save you any time and aren't helpful to TSA or security. Most joint replacements do not set off metal detectors. If the detector is set off, just tell the  you have a joint replacement.  Signs/Symptoms of Concern: Contact Dr. Marcia Reza office if any of the following signs or symptoms develop. Please be advised if a problem arises which you feel requires immediate medical attention you should seek medical attention at the closest ER.   o Temperature greater than 101.5 for more than 8 hrs  o Fever and/or chills that persist for greater than 8 hr.  o A sudden increase in pain/swelling/ or tenderness in the back of your calf or thigh that isn't relieved with ice/elevation and pain medication. o Increased drainage from your incision, increased redness or warmth at the area of your incision or a sudden increase in swelling or redness that persists despite ice and elevation     Feedback  o I want to provide the very best care for you. Feel free to email me comments and suggestions on how I can improve the experience for you and future patients. I will be here with you every step of the way. It is my privilege to be your surgeon. Email: [email or web address] or Website: [email or web address]  SPECIAL TOTAL KNEE INSTRUCTIONS  1. Full extension at the knee is the most important aspect of your range of motion. Avoid placing a pillow or bump behind the knee. Rather, place the heel up on a bump or pillow and allow gravity to help straighten the knee. 2. You may weight bear as tolerated on the knee and during the day you should bend the knee as much as possible. 3. Drainage from the incision more than 4 days from surgery is concerning. Contact my office if there is any question [phone]. [phone].  4. Steps to take for knee drainage :  a. Keep the knee fully extended, avoid knee flexion beyond 45 degrees until the drainage stops. b. Apply gauze dressing over the incision followed by an ace-wrap applied snugly  c. Hold physical therapy  d. If the wound continues to drain after 4-6 hours, call the office and hold all blood thinners.

## 2020-06-25 NOTE — PROGRESS NOTES
Problem: Falls - Risk of  Goal: *Absence of Falls  Description: Document Jessee Stephens Fall Risk and appropriate interventions in the flowsheet.   Outcome: Resolved/Met  Note: Fall Risk Interventions:  Mobility Interventions: Bed/chair exit alarm         Medication Interventions: Bed/chair exit alarm    Elimination Interventions: Bed/chair exit alarm              Problem: Patient Education: Go to Patient Education Activity  Goal: Patient/Family Education  Outcome: Resolved/Met     Problem: Pain  Goal: *Control of Pain  Outcome: Resolved/Met     Problem: Patient Education: Go to Patient Education Activity  Goal: Patient/Family Education  Outcome: Resolved/Met     Problem: Infection - Risk of, Surgical Site Infection  Goal: *Absence of surgical site infection signs and symptoms  Outcome: Resolved/Met     Problem: Patient Education: Go to Patient Education Activity  Goal: Patient/Family Education  Outcome: Resolved/Met

## 2020-06-25 NOTE — PROGRESS NOTES
Problem: Mobility Impaired (Adult and Pediatric)  Goal: *Acute Goals and Plan of Care (Insert Text)  Description: FUNCTIONAL STATUS PRIOR TO ADMISSION: Patient was independent and active without use of DME.    HOME SUPPORT PRIOR TO ADMISSION: The patient lived with wife but did not require assist.    Physical Therapy Goals  Initiated 6/25/2020    1. Patient will move from supine to sit and sit to supine  in bed with independence within 4 days. 2. Patient will perform sit to stand with independence within 4 days. 3. Patient will ambulate with independence for 250 feet with the least restrictive device within 4 days. 4. Patient will ascend/descend 12 stairs with 1 handrail(s) with independence within 4 days. 5. Patient will perform home exercise program per protocol with independence within 4 days. 6. Patient will demonstrate AROM 0-90 degrees in operative joint within 4 days. Outcome: Progressing Towards Goal   PHYSICAL THERAPY EVALUATION  Patient: Jimmy Quispe (85 y.o. male)  Date: 6/25/2020  Primary Diagnosis: Primary osteoarthritis of right knee [M17.11]  Primary localized osteoarthritis of right knee [M17.11]  Procedure(s) (LRB):  RIGHT TOTAL KNEE ARTHROPLASTY MAKOPLASTY (SPINAL WITH REGIONAL) (Right) 1 Day Post-Op   Precautions:   WBAT, Fall      ASSESSMENT  Based on the objective data described below, the patient presents with decreased ROM, antalgic gait, intact balance and full return of sensory/motor control following R TKA POD#1. Pt verbalizes excellent recall of HEP and has a very supportive wife present during evaluation. Following verbal cueing to improve gait mechanics pt able to demonstrate reciprocal gait pattern with good weight bearing on operative leg. No LOB, buckling or increase in pain(4/10) with ambulation. Navigated 12 steps with railings/cane and wife providing assistance. Reviewed HEP, icing, positional changes, car/shower transfers and dressing with patient.  Cleared for discharge at this time. Current Level of Function Impacting Discharge (mobility/balance): cleared for discharge    Functional Outcome Measure: The patient scored 25/28 on the Tinetti outcome measure which is indicative of low fall risk . Other factors to consider for discharge: none     Patient will benefit from skilled therapy intervention to address the above noted impairments. PLAN :  Recommendations and Planned Interventions: bed mobility training, transfer training, gait training, therapeutic exercises, neuromuscular re-education, patient and family training/education, and therapeutic activities      Frequency/Duration: Patient will be followed by physical therapy:  twice daily to address goals. Recommendation for discharge: (in order for the patient to meet his/her long term goals)  Outpatient physical therapy follow up recommended for ROM and gait training     This discharge recommendation:  Has been made in collaboration with the attending provider and/or case management    IF patient discharges home will need the following DME: patient owns DME required for discharge         SUBJECTIVE:   Patient stated I have a cane but I think I want a classier one.     OBJECTIVE DATA SUMMARY:   HISTORY:    Past Medical History:   Diagnosis Date    Aortic valve sclerosis 2015    no stenosis. normal stress echo 9/2016    ARF (acute renal failure) ([address] 9/23/2012    Basal cell carcinoma of skin 2018    left shin.       Bleeding gums 07/02/2018    All workup negative from Hematology    Bruised ribs 01/10/2019    Chronic heel pain, right 07/02/2018    Concussion 01/10/2019    Glaucoma     Dr. Claribel Coker    History of kidney stones     HLD (hyperlipidemia)     Hypertension     Normal cardiac stress test 09/14/2016    TOMY (obstructive sleep apnea) 09/2016    mild TOMY    Osteoarthritis, hand     Pneumonia 09/2012    Syncope and collapse 09/21/2012    due to pneumonia,  recurred 2018 (no evaluation)     Past Surgical History:   Procedure Laterality Date    HX COLONOSCOPY  2014    Dr. Merna Lopez LITHOTRIPSY      HX ORTHOPAEDIC  1990    toe surgery     HX TONSILLECTOMY      HX VASECTOMY         Personal factors and/or comorbidities impacting plan of care: TOMY, HTN    Home Situation  Home Environment: Private residence  # Steps to Enter: 2  One/Two Story Residence: Two story  # of Interior Steps: 13  Living Alone: No  Support Systems: Spouse/Significant Other/Partner  Patient Expects to be Discharged to[de-identified] Private residence  Current DME Used/Available at Home: Joseph Sinning, straight, Grab bars, Shower chair, Walker, rolling, Wheelchair  Tub or Shower Type: Shower    EXAMINATION/PRESENTATION/DECISION MAKING:   Critical Behavior:  Neurologic State: Alert  Orientation Level: Oriented X4  Cognition: Follows commands     Hearing: Auditory  Auditory Impairment: None  Skin:    Edema:   Range Of Motion:  AROM: Generally decreased, functional                       Strength:    Strength: Generally decreased, functional                    Tone & Sensation:   Tone: Normal              Sensation: Intact               Coordination:  Coordination: Within functional limits  Vision:      Functional Mobility:  Bed Mobility:  Rolling: Independent  Supine to Sit: Independent  Sit to Supine: Independent     Transfers:  Sit to Stand: Independent  Stand to Sit: Independent  Stand Pivot Transfers: Independent     Bed to Chair: Independent              Balance:   Sitting: Intact  Standing: Intact; With support  Ambulation/Gait Training:  Distance (ft): 180 Feet (ft)  Assistive Device: Gait belt;Walker, rolling  Ambulation - Level of Assistance: Supervision        Gait Abnormalities: Antalgic  Right Side Weight Bearing: As tolerated     Base of Support: Widened     Speed/Eliana: Pace decreased (<100 feet/min)                        Stairs:  Number of Stairs Trained: 12  Stairs - Level of Assistance: Supervision   Rail Use: (both, right and none)    Therapeutic Exercises:   Knee HEP    Functional Measure:  Tinetti test:    Sitting Balance: 1  Arises: 1  Attempts to Rise: 2  Immediate Standing Balance: 2  Standing Balance: 2  Nudged: 1  Eyes Closed: 1  Turn 360 Degrees - Continuous/Discontinuous: 1  Turn 360 Degrees - Steady/Unsteady: 1  Sitting Down: 2  Balance Score: 14 Balance total score  Indication of Gait: 1  R Step Length/Height: 1  L Step Length/Height: 1  R Foot Clearance: 1  L Foot Clearance: 1  Step Symmetry: 1  Step Continuity: 1  Path: 2  Trunk: 1  Walking Time: 1  Gait Score: 11 Gait total score  Total Score: 25/28 Overall total score         Tinetti Tool Score Risk of Falls  <19 = High Fall Risk  19-24 = Moderate Fall Risk  25-28 = Low Fall Risk  Tinetti ME. Performance-Oriented Assessment of Mobility Problems in Elderly Patients. [address]; [id number]. (Scoring Description: PT Bulletin Feb. 10, 1993)    Older adults: Clif Muniz et al, 2009; n = 1000 [facility] elderly evaluated with ABC, ADAN, ADL, and IADL)  · Mean ADAN score for males aged 69-68 years = 26.21(3.40)  · Mean ADAN score for females age 69-68 years = 25.16(4.30)  · Mean ADAN score for males over 80 years = 23.29(6.02)  · Mean ADAN score for females over 80 years = 17.20(8.32)            Physical Therapy Evaluation Charge Determination   History Examination Presentation Decision-Making   Medium MEDIUM Complexity : 3 Standardized tests and measures addressing body structure, function, activity limitation and / or participation in recreation  LOW Complexity : Stable, uncomplicated  Other outcome measures Tinetti  LOW       Based on the above components, the patient evaluation is determined to be of the following complexity level: LOW     Pain Ratin/10    Activity Tolerance:   Good  Please refer to the flowsheet for vital signs taken during this treatment.     After treatment patient left in no apparent distress:   Sitting in chair, Call bell within reach, and Caregiver / family present    COMMUNICATION/EDUCATION:   The patients plan of care was discussed with: Registered nurse. Fall prevention education was provided and the patient/caregiver indicated understanding., Patient/family have participated as able in goal setting and plan of care. , and Patient/family agree to work toward stated goals and plan of care.     Thank you for this referral.  Le Ruelas, PT   Time Calculation: 42 mins

## 2020-06-25 NOTE — PROGRESS NOTES
CMS Note  6/25/2020    Patient received and signed the Observation letter. Patient was given a copy for their record.   Deidra Vo CMS

## 2020-06-25 NOTE — PROGRESS NOTES
Patient received a copy of instruction. New scripts were E-scribed to his pharmacy. Nurse read and explained patients' medication and instructions to him and his wife.  Verbalized understanding

## 2020-06-26 ENCOUNTER — PATIENT OUTREACH (OUTPATIENT)
Dept: INTERNAL MEDICINE CLINIC | Age: 70
End: 2020-06-26

## 2020-06-26 NOTE — PROGRESS NOTES
Patient was admitted to [facility] on 2020 and discharged on 2020 for elective Right TKR. Patient was contacted within 1 business days of discharge. Top Discharge Challenges to be reviewed by the provider   Elective Right TKR  - declined PCP f/u at this time  - ortho f/u   - starting OP PT on   Discussed COVID-19 related testing which was available at this time. Test results were negative. Patient informed of results, if available? yes  Method of communication with provider : chart routing       Advance Care Planning:   Does patient have an Advance Directive:  patient declined education     Inpatient Readmission Risk score: n/a  Was this a readmission? no   Patient stated reason for the admission: n/a  Patients top risk factors for readmission: lack of knowledge about disease  Interventions to address risk factors:education provided     Care Transition Nurse (CTN) contacted the patient by telephone to perform post hospital discharge assessment. Verified name and  with patient as identifiers. Provided introduction to self, and explanation of the CTN role. CTN reviewed discharge instructions, medical action plan and red flags with patient who verbalized understanding. Patient given an opportunity to ask questions and does not have any further questions or concerns at this time. The patient agrees to contact the PCP office for questions related to their healthcare. Medication reconciliation was performed with patient, who verbalizes understanding of administration of home medications. Advised obtaining a 90-day supply of all daily and as-needed medications.    Referral to Pharm D needed: no     START taking:  aspirin delayed-release  celecoxib (CELEBREX)  naloxone (NARCAN)  oxyCODONE IR (ROXICODONE)  traMADoL (ULTRAM)  STOP taking:  ibuprofen 200 mg tablet (MOTRIN)    Home Health/Outpatient orders at [address] ordered at discharge: none      Covid Risk Education    Patient has following risk factors of: no known risk factors. Education provided regarding infection prevention, and signs and symptoms of COVID-19 and when to seek medical attention with patient who verbalized understanding. Discussed exposure protocols and quarantine From CDC: Are you at higher risk for severe illness?  and given an opportunity for questions and concerns. The patient agrees to contact the COVID-19 hotline [phone] or PCP office for questions related to COVID-19. For more information on steps you can take to protect yourself, see CDC's How to Protect Yourself     Patient/family/caregiver given information for [facility] and agrees to enroll no  Patient's preferred e-mail: declines  Patient's preferred phone number: declines    Discussed follow-up appointments. If no appointment was previously scheduled, appointment scheduling offered: yes  [facility] follow up appointment(s):   Future Appointments   Date Time Provider Ana Young   8/31/2020  9:00 AM Charles Perez MD [address] [facility] [phone] [id number] [address] follow up appointment(s): Ortho f/u 7/8  Plan for follow-up call in 10-14 days based on severity of symptoms and risk factors. CTN provided contact information for future needs. Goals Addressed                 This Visit's Progress     Returns to baseline activity level.        06/26/20    - Spoke to patient today. Patient lives with wife. - patient is using walker  - no BM since surgery, patient took stool softener yesterday. Patient will monitor stools and if no BM for 2-3 days contact PCP for next step  - patient declined PCP f/u at this time   - patient will attended ortho f/u on 7/8  - patient will start outpatient PT on 6/30  - patient reports that he is having pain but it is manageable with pain meds.    - patient reports he has a small bandage on his incision with no visible drainage  - patient will take shorts walks around the house every 1-2 hours while awake  - Reviewed red flag s/s with patient, nausea, vomiting, inability to pass urine/stool, SOB, mental status change, chest pain, fever.   - reviewed signs of DVT with patient - patient on ASA  - reviewed AVS with patient   - contact info for CTN provided to patient    Patient will contact Md/CTN if red flag s/s arise. CTN to f/u ~ 10-14 days.  AR

## 2020-07-02 ENCOUNTER — TELEPHONE (OUTPATIENT)
Dept: SURGERY | Age: 70
End: 2020-07-02

## 2020-07-02 NOTE — TELEPHONE ENCOUNTER
Post Discharge Phone placed to patient after Joint Replacement surgery   Spoke with patient;  States doing better, had a tuff weekend  States discharge instructions were clear and easy to understand has no questions  Patient was able to fill prescriptions, medications questions answered   States pain is tolerable and pain medication is effective.    Patient is taking narcotics and tylenol  States dressing is intact without drainage  Patient is using a walker without difficulty, moving every hour  Able to do exercises regularly  Bruising is minimal  Swelling is minimal, decreased yesterday  Patient is elevating leg and using ice with good relief  Education r/t positioning provided  Denies N/V, appetite is normal now  Constipation, mild, better now  Follow up appointment is scheduled with surgeon   PT is scheduled   Had 103 fever Sat 6/27, notified Dr. Jacque Carbone, normal temp by [facility] 6/28,   Had cough for a few days at home; denies chest pain, SOB  Discussed calling surgeon or PCP for concerns  Patient states needs were met by the staff while in the hospital  Opportunity given for patient to ask questions

## 2020-07-21 ENCOUNTER — PATIENT OUTREACH (OUTPATIENT)
Dept: CASE MANAGEMENT | Age: 70
End: 2020-07-21

## 2020-07-21 NOTE — PROGRESS NOTES
Goals      Returns to baseline activity level.      06/26/20    - Spoke to patient today. Patient lives with wife. - patient is using walker  - no BM since surgery, patient took stool softener yesterday. Patient will monitor stools and if no BM for 2-3 days contact PCP for next step  - patient declined PCP f/u at this time   - patient will attended ortho f/u on 7/8  - patient will start outpatient PT on 6/30  - patient reports that he is having pain but it is manageable with pain meds. - patient reports he has a small bandage on his incision with no visible drainage  - patient will take shorts walks around the house every 1-2 hours while awake  - Reviewed red flag s/s with patient, nausea, vomiting, inability to pass urine/stool, SOB, mental status change, chest pain, fever.   - reviewed signs of DVT with patient - patient on ASA  - reviewed AVS with patient   - contact info for CTN provided to patient    Patient will contact Md/CTN if red flag s/s arise. CTN to f/u ~ 10-14 days. AR      07/21/20  VM left for patient. CTN to follow up one week.  AR

## 2020-08-12 ENCOUNTER — PATIENT OUTREACH (OUTPATIENT)
Dept: CASE MANAGEMENT | Age: 70
End: 2020-08-12

## 2020-08-12 NOTE — PROGRESS NOTES
Patient has graduated from the Transitions of Care Coordination  program on 08/12/20    . Patient was not referred to the [facility] team for further management. Goals Addressed                 This Visit's Progress     COMPLETED: Returns to baseline activity level.        06/26/20    - Spoke to patient today. Patient lives with wife. - patient is using walker  - no BM since surgery, patient took stool softener yesterday. Patient will monitor stools and if no BM for 2-3 days contact PCP for next step  - patient declined PCP f/u at this time   - patient will attended ortho f/u on 7/8  - patient will start outpatient PT on 6/30  - patient reports that he is having pain but it is manageable with pain meds. - patient reports he has a small bandage on his incision with no visible drainage  - patient will take shorts walks around the house every 1-2 hours while awake  - Reviewed red flag s/s with patient, nausea, vomiting, inability to pass urine/stool, SOB, mental status change, chest pain, fever.   - reviewed signs of DVT with patient - patient on ASA  - reviewed AVS with patient   - contact info for CTN provided to patient    Patient will contact Md/CTN if red flag s/s arise. CTN to f/u ~ 10-14 days. AR      07/21/20  VM left for patient. CTN to follow up one week. AR    08/12/20  Unable to reach patient for final SJ call. Ar            Patient has Care Transition Nurse's contact information for any further questions, concerns, or needs.  (left in VM)  Patients upcoming visits:    Future Appointments   Date Time Provider Ana Young   8/31/2020  9:00 AM Nathan Perez MD [facility] AMB

## 2020-08-31 ENCOUNTER — OFFICE VISIT (OUTPATIENT)
Dept: INTERNAL MEDICINE CLINIC | Age: 70
End: 2020-08-31
Payer: MEDICARE

## 2020-08-31 VITALS
TEMPERATURE: 98.5 F | HEART RATE: 68 BPM | BODY MASS INDEX: 33.98 KG/M2 | WEIGHT: 264.8 LBS | DIASTOLIC BLOOD PRESSURE: 71 MMHG | RESPIRATION RATE: 14 BRPM | OXYGEN SATURATION: 96 % | HEIGHT: 74 IN | SYSTOLIC BLOOD PRESSURE: 125 MMHG

## 2020-08-31 DIAGNOSIS — D50.9 IRON DEFICIENCY ANEMIA, UNSPECIFIED IRON DEFICIENCY ANEMIA TYPE: ICD-10-CM

## 2020-08-31 DIAGNOSIS — I35.8 AORTIC VALVE SCLEROSIS: ICD-10-CM

## 2020-08-31 DIAGNOSIS — Z12.5 SCREENING FOR PROSTATE CANCER: ICD-10-CM

## 2020-08-31 DIAGNOSIS — Z00.00 MEDICARE ANNUAL WELLNESS VISIT, SUBSEQUENT: Primary | ICD-10-CM

## 2020-08-31 DIAGNOSIS — E78.5 DYSLIPIDEMIA: ICD-10-CM

## 2020-08-31 DIAGNOSIS — M1A.0790 CHRONIC GOUT OF FOOT, UNSPECIFIED CAUSE, UNSPECIFIED LATERALITY: ICD-10-CM

## 2020-08-31 DIAGNOSIS — I10 ESSENTIAL HYPERTENSION: ICD-10-CM

## 2020-08-31 DIAGNOSIS — Z96.651 HISTORY OF TOTAL RIGHT KNEE REPLACEMENT (TKR): ICD-10-CM

## 2020-08-31 PROBLEM — M10.9 GOUT: Status: ACTIVE | Noted: 2020-08-01

## 2020-08-31 LAB
BASOPHILS # BLD: 0 K/UL (ref 0–0.1)
BASOPHILS NFR BLD: 0 % (ref 0–1)
CHOLEST SERPL-MCNC: 192 MG/DL
DIFFERENTIAL METHOD BLD: ABNORMAL
EOSINOPHIL # BLD: 0.2 K/UL (ref 0–0.4)
EOSINOPHIL NFR BLD: 3 % (ref 0–7)
ERYTHROCYTE [DISTWIDTH] IN BLOOD BY AUTOMATED COUNT: 13.9 % (ref 11.5–14.5)
HCT VFR BLD AUTO: 35.5 % (ref 36.6–50.3)
HDLC SERPL-MCNC: 42 MG/DL
HDLC SERPL: 4.6 {RATIO} (ref 0–5)
HGB BLD-MCNC: 11.5 G/DL (ref 12.1–17)
IMM GRANULOCYTES # BLD AUTO: 0 K/UL (ref 0–0.04)
IMM GRANULOCYTES NFR BLD AUTO: 0 % (ref 0–0.5)
LDLC SERPL CALC-MCNC: 125.2 MG/DL (ref 0–100)
LIPID PROFILE,FLP: ABNORMAL
LYMPHOCYTES # BLD: 2.1 K/UL (ref 0.8–3.5)
LYMPHOCYTES NFR BLD: 39 % (ref 12–49)
MCH RBC QN AUTO: 31.1 PG (ref 26–34)
MCHC RBC AUTO-ENTMCNC: 32.4 G/DL (ref 30–36.5)
MCV RBC AUTO: 95.9 FL (ref 80–99)
MONOCYTES # BLD: 0.5 K/UL (ref 0–1)
MONOCYTES NFR BLD: 9 % (ref 5–13)
NEUTS SEG # BLD: 2.6 K/UL (ref 1.8–8)
NEUTS SEG NFR BLD: 49 % (ref 32–75)
NRBC # BLD: 0 K/UL (ref 0–0.01)
NRBC BLD-RTO: 0 PER 100 WBC
PLATELET # BLD AUTO: 315 K/UL (ref 150–400)
PMV BLD AUTO: 8.6 FL (ref 8.9–12.9)
RBC # BLD AUTO: 3.7 M/UL (ref 4.1–5.7)
TRIGL SERPL-MCNC: 124 MG/DL (ref ?–150)
URATE SERPL-MCNC: 8.6 MG/DL (ref 3.5–7.2)
VLDLC SERPL CALC-MCNC: 24.8 MG/DL
WBC # BLD AUTO: 5.3 K/UL (ref 4.1–11.1)

## 2020-08-31 PROCEDURE — G8752 SYS BP LESS 140: HCPCS | Performed by: INTERNAL MEDICINE

## 2020-08-31 PROCEDURE — G8510 SCR DEP NEG, NO PLAN REQD: HCPCS | Performed by: INTERNAL MEDICINE

## 2020-08-31 PROCEDURE — G8754 DIAS BP LESS 90: HCPCS | Performed by: INTERNAL MEDICINE

## 2020-08-31 PROCEDURE — G0439 PPPS, SUBSEQ VISIT: HCPCS | Performed by: INTERNAL MEDICINE

## 2020-08-31 PROCEDURE — G0463 HOSPITAL OUTPT CLINIC VISIT: HCPCS | Performed by: INTERNAL MEDICINE

## 2020-08-31 PROCEDURE — 99213 OFFICE O/P EST LOW 20 MIN: CPT | Performed by: INTERNAL MEDICINE

## 2020-08-31 PROCEDURE — 1101F PT FALLS ASSESS-DOCD LE1/YR: CPT | Performed by: INTERNAL MEDICINE

## 2020-08-31 PROCEDURE — 3017F COLORECTAL CA SCREEN DOC REV: CPT | Performed by: INTERNAL MEDICINE

## 2020-08-31 PROCEDURE — G8536 NO DOC ELDER MAL SCRN: HCPCS | Performed by: INTERNAL MEDICINE

## 2020-08-31 PROCEDURE — G8427 DOCREV CUR MEDS BY ELIG CLIN: HCPCS | Performed by: INTERNAL MEDICINE

## 2020-08-31 PROCEDURE — G8417 CALC BMI ABV UP PARAM F/U: HCPCS | Performed by: INTERNAL MEDICINE

## 2020-08-31 RX ORDER — ALLOPURINOL 100 MG/1
100 TABLET ORAL DAILY
Qty: 30 TAB | Refills: 2 | Status: SHIPPED | OUTPATIENT
Start: 2020-08-31 | End: 2020-10-10

## 2020-08-31 RX ORDER — COLCHICINE 0.6 MG/1
0.6 CAPSULE ORAL DAILY
Qty: 30 CAP | Refills: 2 | Status: SHIPPED | OUTPATIENT
Start: 2020-08-31 | End: 2020-11-24 | Stop reason: SDUPTHER

## 2020-08-31 RX ORDER — IBUPROFEN 200 MG
TABLET ORAL
COMMUNITY

## 2020-08-31 NOTE — PROGRESS NOTES
This is a Subsequent Medicare Annual Wellness Visit providing Personalized Prevention Plan Services (PPPS) (Performed 12 months after initial AWV and PPPS )    I have reviewed the patient's medical history in detail and updated the computerized patient record. Tolerated right total knee replacement on June 24, 2020 with Dr. Lianne Trotter. Hypertension  Hypertension ROS: taking medications as instructed, no medication side effects noted, no TIA's, no chest pain on exertion, no dyspnea on exertion, no swelling of ankles     reports that he quit smoking about 39 years ago. His smoking use included cigarettes. He smoked 1.50 packs per day. He has never used smokeless tobacco.    reports current alcohol use of about 4.0 standard drinks of alcohol per week. BP Readings from Last 2 Encounters:   08/31/20 125/71   06/25/20 109/65     Gout follow-up  He is seeing podiatry Dr. Geraldine Gregg for chronic recurrent foot pains. Was diagnosed with probable gout. He does get pain in his MTP and then his right lateral foot at times. Was asked to have uric acid level and CBC done. He ports pain about 7 days ago. Current gout treatment: Ibuprofen as needed. Side effects of current therapy: none. The patient is attempting to avoid high purine foods and alcohol. He does take hydrochlorothiazide and blood pressure med. Hyperlipidemia  Taking no medications  No new myalgias, no joint pains, no weakness  No TIA's, no chest pain on exertion, no dyspnea on exertion, no swelling of ankles. Lab Results   Component Value Date/Time    Cholesterol, total 195 08/28/2019 08:49 AM    HDL Cholesterol 41 08/28/2019 08:49 AM    LDL, calculated 129 (H) 08/28/2019 08:49 AM    VLDL, calculated 25 08/28/2019 08:49 AM    Triglyceride 127 08/28/2019 08:49 AM         History     Past Medical History:   Diagnosis Date    Aortic valve sclerosis 2015    no stenosis.   normal stress echo 9/2016    ARF (acute renal failure) ([address] 9/23/2012    Basal cell carcinoma of skin 2018    left shin.  Bleeding gums 07/02/2018    All workup negative from Hematology    Bruised ribs 01/10/2019    Chronic heel pain, right 07/02/2018    Concussion 01/10/2019    Glaucoma     Dr. Rajni Bynum Gout 08/2020    History of kidney stones     HLD (hyperlipidemia)     Hypertension     Normal cardiac stress test 09/14/2016    TOMY (obstructive sleep apnea) 09/2016    mild TOMY    Osteoarthritis, hand     Pneumonia 09/2012    Syncope and collapse 09/21/2012    due to pneumonia,  recurred 2018 (no evaluation)       Past Surgical History:   Procedure Laterality Date    HX COLONOSCOPY  2014    Dr. Shoemaker Means HX KNEE REPLACEMENT  06/24/2020    Right knee.  HX LITHOTRIPSY      HX ORTHOPAEDIC  1990    toe surgery     HX TONSILLECTOMY      HX VASECTOMY         Current Outpatient Medications   Medication Sig    ibuprofen (Motrin IB) 200 mg tablet Take  by mouth Before breakfast, lunch, dinner and at bedtime.  colchicine (MITIGARE) 0.6 mg capsule Take 1 Cap by mouth daily.  allopurinoL (ZYLOPRIM) 100 mg tablet Take 1 Tab by mouth daily.  sildenafil citrate (Viagra) 100 mg tablet Take 1 Tab by mouth as needed for Erectile Dysfunction. Indications: the inability to have an erection    Edarbyclor 40-12.5 mg tab Take 1 Tab by mouth daily. No current facility-administered medications for this visit. Allergies   Allergen Reactions    Ace Inhibitors Cough       Family History   Problem Relation Age of Onset    Hypertension Mother     Stroke Mother     Cancer Mother         breast    Hypertension Father     Heart Disease Father         valvular    Deep Vein Thrombosis Neg Hx     Anesth Problems Neg Hx         reports that he quit smoking about 39 years ago. His smoking use included cigarettes. He smoked 1.50 packs per day. He has never used smokeless tobacco.   reports current alcohol use of about 4.0 standard drinks of alcohol per week.       Depression Risk Factor Screening:       Alcohol Risk Factor Screening: On any occasion during the past 3 months, have you had more than 3 drinks containing alcohol? No    Do you average more than 14 drinks per week? No      Functional Ability and Level of Safety:     Hearing Loss   mild    Activities of Daily Living   Self-care. Requires assistance with: no ADLs    Fall Risk     Fall Risk Assessment, last 12 mths 8/31/2020   Able to walk? Yes   Fall in past 12 months? No   Fall with injury? -   Number of falls in past 12 months -   Fall Risk Score -         Abuse Screen   Patient is not abused    Review of Systems   A comprehensive review of systems was negative except for that written in the HPI. Physical Examination     Evaluation of Cognitive Function:  Mood/affect:  neutral, happy  Appearance: age appropriate  Family member/caregiver input: none    Blood pressure 125/71, pulse 68, temperature 98.5 °F (36.9 °C), temperature source Oral, resp. rate 14, height 6' 2\" (1.88 m), weight 264 lb 12.8 oz (120.1 kg), SpO2 96 %. General appearance: alert, cooperative, no distress, appears stated age  Neck: supple, symmetrical, trachea midline, no adenopathy, thyroid: not enlarged, symmetric, no tenderness/mass/nodules, no carotid bruit and no JVD  Lungs: clear to auscultation bilaterally  Heart: regular rate and rhythm, S1, S2 normal, 3/6 SE murmur, click, rub or gallop  Extremities: extremities normal, atraumatic, no cyanosis or edema    Patient Care Team:  Bruce Boston MD as PCP - General (Internal Medicine)  Bruce Boston MD as PCP - [facility]  Char Jennings MD as Physician (Cardiology)  Don Duckworth MD as Physician (Sleep Medicine)      Advice/Referrals/Counseling   Education and counseling provided. See below for specific orders    Assessment/Plan   Diagnoses and all orders for this visit:    1.  Medicare annual wellness visit, subsequent  Recommend glaucoma screening.  -     PSA W/ REFLX FREE PSA; Future    2. Screening for prostate cancer  Deferred BRAEDEN today  -     PSA W/ REFLX FREE PSA; Future    3. Essential hypertension  Controlled on current regimen. Continue current medications as written in chart.  -     CBC WITH AUTOMATED DIFF; Future    4. Chronic gout of foot, unspecified cause, unspecified laterality  Discussed etiology of gout. This is a probable case. Recommend low purine diet and prophylactic therapy. I favor long-term use of allopurinol but will need prophylactic colchicine to start off with. Check uric acid level today, but it may be falsely low because of recent flare. Target uric acid less than 6.0 on labs in the next couple of months. Discussed option of eliminating hydrochlorothiazide as a factor that may contribute to uric acid elevation, but since his hypertension is controlled, we will keep it for now. Side effects discussed. -     URIC ACID; Future  -     colchicine (MITIGARE) 0.6 mg capsule; Take 1 Cap by mouth daily. -     allopurinoL (ZYLOPRIM) 100 mg tablet; Take 1 Tab by mouth daily. Repeat uric acid level in 4 to 6 weeks  -     URIC ACID; Future    5. Dyslipidemia  The patient is asked to make an attempt to improve diet and exercise patterns to aid in medical management of this problem. -     LIPID PANEL; Future    6. History of total right knee replacement (TKR)  Healing well. He is completed physical therapy    7. Iron deficiency anemia, unspecified iron deficiency anemia type  -     CBC WITH AUTOMATED DIFF; Future    8. Aortic valve sclerosis  Murmur sounds stable. No evidence of regurgitation on exam.  . Potential medication side effects were discussed with the patient; let me know if any occur.   Return for yearly Annual Wellness Visits

## 2020-09-01 DIAGNOSIS — M10.9 GOUT OF FOOT, UNSPECIFIED CAUSE, UNSPECIFIED CHRONICITY, UNSPECIFIED LATERALITY: Primary | ICD-10-CM

## 2020-09-01 LAB
PSA SERPL-MCNC: 1.4 NG/ML (ref 0–4)
REFLEX CRITERIA: NORMAL

## 2020-09-02 ENCOUNTER — PATIENT MESSAGE (OUTPATIENT)
Dept: INTERNAL MEDICINE CLINIC | Age: 70
End: 2020-09-02

## 2020-09-02 RX ORDER — AMOXICILLIN 500 MG/1
CAPSULE ORAL
COMMUNITY
Start: 2020-08-05 | End: 2021-05-25 | Stop reason: ALTCHOICE

## 2020-09-02 NOTE — TELEPHONE ENCOUNTER
From: Dariusz Decker  To: Ave Villareal MD  Sent: 9/2/2020 11:58 AM EDT  Subject: Serenity Bowser Dr[facility], during my office visit on Monday, 8/31/20, we updated my list of current medication. I forgot to mention that my surgeon, Dr. David Wells, prescribed Amoxicillin (4 capsules, 1 dose) for use prior to dental cleaning. I havent used it yet, but I anticipate a dental visit before year end. I doubt this creates any conflict, but I thought it should be included in my current list.    M. Hadassah Seip  [phone]  [email or web address]

## 2020-10-05 ENCOUNTER — APPOINTMENT (OUTPATIENT)
Dept: INTERNAL MEDICINE CLINIC | Age: 70
End: 2020-10-05

## 2020-10-05 DIAGNOSIS — M10.9 GOUT OF FOOT, UNSPECIFIED CAUSE, UNSPECIFIED CHRONICITY, UNSPECIFIED LATERALITY: ICD-10-CM

## 2020-10-05 LAB — URATE SERPL-MCNC: 7.5 MG/DL (ref 3.5–7.2)

## 2020-10-10 DIAGNOSIS — M1A.0790 CHRONIC GOUT OF FOOT, UNSPECIFIED CAUSE, UNSPECIFIED LATERALITY: ICD-10-CM

## 2020-10-10 DIAGNOSIS — M10.9 GOUT OF FOOT, UNSPECIFIED CAUSE, UNSPECIFIED CHRONICITY, UNSPECIFIED LATERALITY: Primary | ICD-10-CM

## 2020-10-10 RX ORDER — ALLOPURINOL 300 MG/1
300 TABLET ORAL DAILY
Qty: 30 TAB | Refills: 3 | Status: SHIPPED | OUTPATIENT
Start: 2020-10-10 | End: 2020-11-15 | Stop reason: SDUPTHER

## 2020-11-09 ENCOUNTER — TELEPHONE (OUTPATIENT)
Dept: INTERNAL MEDICINE CLINIC | Age: 70
End: 2020-11-09

## 2020-11-09 DIAGNOSIS — M10.9 GOUT, UNSPECIFIED CAUSE, UNSPECIFIED CHRONICITY, UNSPECIFIED SITE: Primary | ICD-10-CM

## 2020-11-09 NOTE — TELEPHONE ENCOUNTER
I called pt back, I advise he can walk into [facility] to have labs drawn.  Lab order faxed to [address]

## 2020-11-09 NOTE — TELEPHONE ENCOUNTER
----- Message from Jeffrey Palencia sent at 11/9/2020 12:04 PM EST -----  Regarding: Dr Nate Delgado first and last name: Pt  Reason for call: Labs to be done/Lab order has been written  Callback required yes/no and why: Yes, appt (?) or walk-in to lab (?)  Best contact number(s): [phone]  Details to clarify the request: Pt would like to know if can walk-in for his lab appt since his lab order has already been written.

## 2020-11-10 ENCOUNTER — HOSPITAL ENCOUNTER (OUTPATIENT)
Dept: LAB | Age: 70
Discharge: HOME | End: 2020-11-10
Payer: MEDICARE

## 2020-11-10 PROCEDURE — 36415 COLL VENOUS BLD VENIPUNCTURE: CPT

## 2020-11-10 PROCEDURE — 84550 ASSAY OF BLOOD/URIC ACID: CPT

## 2020-11-11 LAB — URATE SERPL-MCNC: 7.6 MG/DL (ref 3.7–8.6)

## 2020-11-15 DIAGNOSIS — M1A.0790 CHRONIC GOUT OF FOOT, UNSPECIFIED CAUSE, UNSPECIFIED LATERALITY: ICD-10-CM

## 2020-11-15 RX ORDER — ALLOPURINOL 300 MG/1
450 TABLET ORAL DAILY
Qty: 135 TAB | Refills: 1
Start: 2020-11-15 | End: 2020-12-17 | Stop reason: SDUPTHER

## 2020-11-24 ENCOUNTER — PATIENT MESSAGE (OUTPATIENT)
Dept: INTERNAL MEDICINE CLINIC | Age: 70
End: 2020-11-24

## 2020-11-24 DIAGNOSIS — M1A.0790 CHRONIC GOUT OF FOOT, UNSPECIFIED CAUSE, UNSPECIFIED LATERALITY: ICD-10-CM

## 2020-11-24 RX ORDER — COLCHICINE 0.6 MG/1
0.6 CAPSULE ORAL DAILY
Qty: 30 CAP | Refills: 2 | Status: SHIPPED | OUTPATIENT
Start: 2020-11-24 | End: 2021-05-25 | Stop reason: SDUPTHER

## 2020-12-11 DIAGNOSIS — M1A.0790 CHRONIC GOUT OF FOOT, UNSPECIFIED CAUSE, UNSPECIFIED LATERALITY: Primary | ICD-10-CM

## 2020-12-14 ENCOUNTER — HOSPITAL ENCOUNTER (OUTPATIENT)
Dept: LAB | Age: 70
Discharge: HOME | End: 2020-12-14
Payer: MEDICARE

## 2020-12-14 PROCEDURE — 84550 ASSAY OF BLOOD/URIC ACID: CPT

## 2020-12-14 PROCEDURE — 36415 COLL VENOUS BLD VENIPUNCTURE: CPT

## 2020-12-15 LAB — URATE SERPL-MCNC: 5.2 MG/DL (ref 3.8–8.4)

## 2020-12-17 DIAGNOSIS — M1A.0790 CHRONIC GOUT OF FOOT, UNSPECIFIED CAUSE, UNSPECIFIED LATERALITY: ICD-10-CM

## 2020-12-17 RX ORDER — ALLOPURINOL 300 MG/1
450 TABLET ORAL DAILY
Qty: 135 TAB | Refills: 1 | Status: SHIPPED | OUTPATIENT
Start: 2020-12-17 | End: 2021-05-26

## 2021-05-25 ENCOUNTER — OFFICE VISIT (OUTPATIENT)
Dept: INTERNAL MEDICINE CLINIC | Age: 71
End: 2021-05-25
Payer: MEDICARE

## 2021-05-25 VITALS
TEMPERATURE: 98.4 F | HEART RATE: 76 BPM | DIASTOLIC BLOOD PRESSURE: 80 MMHG | OXYGEN SATURATION: 96 % | BODY MASS INDEX: 34.32 KG/M2 | WEIGHT: 267.4 LBS | RESPIRATION RATE: 13 BRPM | HEIGHT: 74 IN | SYSTOLIC BLOOD PRESSURE: 122 MMHG

## 2021-05-25 DIAGNOSIS — M10.9 GOUT OF FOOT, UNSPECIFIED CAUSE, UNSPECIFIED CHRONICITY, UNSPECIFIED LATERALITY: Primary | ICD-10-CM

## 2021-05-25 DIAGNOSIS — I10 ESSENTIAL HYPERTENSION: ICD-10-CM

## 2021-05-25 DIAGNOSIS — I95.9 HYPOTENSION, UNSPECIFIED HYPOTENSION TYPE: ICD-10-CM

## 2021-05-25 DIAGNOSIS — I35.8 AORTIC VALVE SCLEROSIS: ICD-10-CM

## 2021-05-25 LAB
ALBUMIN SERPL-MCNC: 3.9 G/DL (ref 3.5–5)
ALBUMIN/GLOB SERPL: 0.9 {RATIO} (ref 1.1–2.2)
ALP SERPL-CCNC: 66 U/L (ref 45–117)
ALT SERPL-CCNC: 36 U/L (ref 12–78)
ANION GAP SERPL CALC-SCNC: 6 MMOL/L (ref 5–15)
AST SERPL-CCNC: 32 U/L (ref 15–37)
BASOPHILS # BLD: 0 K/UL (ref 0–0.1)
BASOPHILS NFR BLD: 1 % (ref 0–1)
BILIRUB SERPL-MCNC: 0.5 MG/DL (ref 0.2–1)
BUN SERPL-MCNC: 31 MG/DL (ref 6–20)
BUN/CREAT SERPL: 21 (ref 12–20)
CALCIUM SERPL-MCNC: 9.5 MG/DL (ref 8.5–10.1)
CHLORIDE SERPL-SCNC: 103 MMOL/L (ref 97–108)
CO2 SERPL-SCNC: 30 MMOL/L (ref 21–32)
CREAT SERPL-MCNC: 1.46 MG/DL (ref 0.7–1.3)
DIFFERENTIAL METHOD BLD: ABNORMAL
EOSINOPHIL # BLD: 0.2 K/UL (ref 0–0.4)
EOSINOPHIL NFR BLD: 6 % (ref 0–7)
ERYTHROCYTE [DISTWIDTH] IN BLOOD BY AUTOMATED COUNT: 13 % (ref 11.5–14.5)
GLOBULIN SER CALC-MCNC: 4.5 G/DL (ref 2–4)
GLUCOSE SERPL-MCNC: 96 MG/DL (ref 65–100)
HCT VFR BLD AUTO: 40 % (ref 36.6–50.3)
HGB BLD-MCNC: 13 G/DL (ref 12.1–17)
IMM GRANULOCYTES # BLD AUTO: 0 K/UL (ref 0–0.04)
IMM GRANULOCYTES NFR BLD AUTO: 0 % (ref 0–0.5)
LYMPHOCYTES # BLD: 1.4 K/UL (ref 0.8–3.5)
LYMPHOCYTES NFR BLD: 35 % (ref 12–49)
MCH RBC QN AUTO: 32.9 PG (ref 26–34)
MCHC RBC AUTO-ENTMCNC: 32.5 G/DL (ref 30–36.5)
MCV RBC AUTO: 101.3 FL (ref 80–99)
MONOCYTES # BLD: 0.6 K/UL (ref 0–1)
MONOCYTES NFR BLD: 15 % (ref 5–13)
NEUTS SEG # BLD: 1.7 K/UL (ref 1.8–8)
NEUTS SEG NFR BLD: 43 % (ref 32–75)
NRBC # BLD: 0 K/UL (ref 0–0.01)
NRBC BLD-RTO: 0 PER 100 WBC
PLATELET # BLD AUTO: 235 K/UL (ref 150–400)
PMV BLD AUTO: 9 FL (ref 8.9–12.9)
POTASSIUM SERPL-SCNC: 3.8 MMOL/L (ref 3.5–5.1)
PROT SERPL-MCNC: 8.4 G/DL (ref 6.4–8.2)
RBC # BLD AUTO: 3.95 M/UL (ref 4.1–5.7)
SODIUM SERPL-SCNC: 139 MMOL/L (ref 136–145)
URATE SERPL-MCNC: 4.9 MG/DL (ref 3.5–7.2)
WBC # BLD AUTO: 4 K/UL (ref 4.1–11.1)

## 2021-05-25 PROCEDURE — 1101F PT FALLS ASSESS-DOCD LE1/YR: CPT | Performed by: INTERNAL MEDICINE

## 2021-05-25 PROCEDURE — G0463 HOSPITAL OUTPT CLINIC VISIT: HCPCS | Performed by: INTERNAL MEDICINE

## 2021-05-25 PROCEDURE — G8510 SCR DEP NEG, NO PLAN REQD: HCPCS | Performed by: INTERNAL MEDICINE

## 2021-05-25 PROCEDURE — 99213 OFFICE O/P EST LOW 20 MIN: CPT | Performed by: INTERNAL MEDICINE

## 2021-05-25 PROCEDURE — G8427 DOCREV CUR MEDS BY ELIG CLIN: HCPCS | Performed by: INTERNAL MEDICINE

## 2021-05-25 PROCEDURE — G8754 DIAS BP LESS 90: HCPCS | Performed by: INTERNAL MEDICINE

## 2021-05-25 PROCEDURE — G8536 NO DOC ELDER MAL SCRN: HCPCS | Performed by: INTERNAL MEDICINE

## 2021-05-25 PROCEDURE — G8752 SYS BP LESS 140: HCPCS | Performed by: INTERNAL MEDICINE

## 2021-05-25 PROCEDURE — G8417 CALC BMI ABV UP PARAM F/U: HCPCS | Performed by: INTERNAL MEDICINE

## 2021-05-25 PROCEDURE — 3017F COLORECTAL CA SCREEN DOC REV: CPT | Performed by: INTERNAL MEDICINE

## 2021-05-25 RX ORDER — OLMESARTAN MEDOXOMIL AND HYDROCHLOROTHIAZIDE 40/12.5 40; 12.5 MG/1; MG/1
1 TABLET ORAL DAILY
Qty: 90 TABLET | Refills: 1 | Status: SHIPPED | OUTPATIENT
Start: 2021-05-25

## 2021-05-25 RX ORDER — COLCHICINE 0.6 MG/1
0.6 CAPSULE ORAL
Qty: 30 CAPSULE | Refills: 2
Start: 2021-05-25

## 2021-05-25 NOTE — PROGRESS NOTES
HISTORY OF PRESENT ILLNESS    Chief Complaint   Patient presents with    Labs     Check uric acid levels - fasting.  Hypertension     Review meds - B/p drops low when working out in the yard. Presents for follow-up    Hypertension  Reports BP decreases with extended yard work or riding bike. It does not occur w mild exertion. Was 80/48 last weekend. Denies associated chest pain, palpitation. Does not drink a lot of fluids when in the yard often. Sometimes has gator-aid. Hypertension ROS: taking medications as instructed, no medication side effects noted, no TIA's, no chest pain on exertion, no dyspnea on exertion, no swelling of ankles     reports that he quit smoking about 40 years ago. His smoking use included cigarettes. He smoked 1.50 packs per day. He has never used smokeless tobacco.    reports current alcohol use of about 4.0 standard drinks of alcohol per week. BP Readings from Last 2 Encounters:   05/25/21 122/80   08/31/20 125/71     Gout follow-up  Past gout history: acute gouty arthritis. Current gout treatment: allopurinol 450 mg. Side effects of current therapy: none. Progress since last visit:  no acute gout attacks since last clinic visit. .  The patient is attempting to avoid high purine foods and alcohol.    Lab Results   Component Value Date/Time    Uric acid 5.2 12/14/2020 09:01 AM         Review of Systems   All other systems reviewed and are negative, except as noted in HPI    Past Medical and Surgical History   has a past medical history of Aortic valve sclerosis (2015), ARF (acute renal failure) ([facility] [address] (9/23/2012), Basal cell carcinoma of skin (2018), Bleeding gums (07/02/2018), Bruised ribs (01/10/2019), Chronic heel pain, right (07/02/2018), Concussion (01/10/2019), Glaucoma, Gout (08/2020), History of kidney stones, HLD (hyperlipidemia), Hypertension, Normal cardiac stress test (09/14/2016), TOMY (obstructive sleep apnea) (09/2016), Osteoarthritis, hand, Pneumonia (09/2012), S/P TKR (total knee replacement), right (06/24/2020), and Syncope and collapse (09/21/2012). has a past surgical history that includes hx lithotripsy; hx tonsillectomy; hx colonoscopy (2014); hx orthopaedic (1990); hx vasectomy; and hx knee replacement (Right, 06/24/2020). reports that he quit smoking about 40 years ago. His smoking use included cigarettes. He smoked 1.50 packs per day. He has never used smokeless tobacco. He reports current alcohol use of about 4.0 standard drinks of alcohol per week. He reports that he does not use drugs. family history includes Cancer in his mother; Heart Disease in his father; Hypertension in his father and mother; Stroke in his mother. Physical Exam   Nursing note and vitals reviewed. Blood pressure 122/80, pulse 76, temperature 98.4 °F (36.9 °C), temperature source Oral, resp. rate 13, height 6' 2\" (1.88 m), weight 267 lb 6.4 oz (121.3 kg), SpO2 96 %. Constitutional:  No distress. Eyes: Conjunctivae are normal.   Ears:  Hearing grossly intact  Cardiovascular: Normal rate. regular rhythm, 3/6 WIL at RUSB, no gallops  No edema  Pulmonary/Chest: Effort normal.   CTAB  Musculoskeletal: moves all 4 extremities   Neurological: Alert and oriented to person, place, and time. Skin: No visible rash noted. Psychiatric: Normal mood and affect. Behavior is normal.     ASSESSMENT and PLAN  Diagnoses and all orders for this visit:    1. Gout   Currently asymptomatic on allopurinol  -     URIC ACID; Future    2. Essential hypertension - over-controlled? Cost of Edarbi-chlor is an issue. Will change to olmesartan-hctz  -     CBC WITH AUTOMATED DIFF; Future  -     METABOLIC PANEL, COMPREHENSIVE; Future    3. Aortic valve sclerosis- murmur. recommend f/u echo. Low BP wth exertion. Need to eval for stenosis. Consider cardiology referral again.       4. Hypotension, unspecified hypotension type  -     REFERRAL TO CARDIOLOGY      lab results and schedule of future lab studies reviewed with patient  reviewed medications and side effects in detail    Return to clinic for further evaluation if new symptoms develop        Current Outpatient Medications   Medication Sig    colchicine (MITIGARE) 0.6 mg capsule Take 1 Capsule by mouth two (2) times daily as needed (for gout flares).  olmesartan-hydroCHLOROthiazide (BENICAR HCT) 40-12.5 mg per tablet Take 1 Tablet by mouth daily. Replaces Edarbi-hctz 5/25/21    allopurinoL (ZYLOPRIM) 300 mg tablet Take 1.5 Tabs by mouth daily. Increased dose 11/15/20    ibuprofen (Motrin IB) 200 mg tablet Take  by mouth Before breakfast, lunch, dinner and at bedtime.  sildenafil citrate (Viagra) 100 mg tablet Take 1 Tab by mouth as needed for Erectile Dysfunction. Indications: the inability to have an erection     No current facility-administered medications for this visit.

## 2021-06-16 ENCOUNTER — HOSPITAL ENCOUNTER (OUTPATIENT)
Dept: NON INVASIVE DIAGNOSTICS | Age: 71
Discharge: HOME | End: 2021-06-16
Attending: INTERNAL MEDICINE
Payer: MEDICARE

## 2021-06-16 VITALS
DIASTOLIC BLOOD PRESSURE: 84 MMHG | WEIGHT: 267 LBS | HEIGHT: 74 IN | SYSTOLIC BLOOD PRESSURE: 149 MMHG | BODY MASS INDEX: 34.27 KG/M2

## 2021-06-16 DIAGNOSIS — I35.8 AORTIC VALVE SCLEROSIS: ICD-10-CM

## 2021-06-16 DIAGNOSIS — I95.9 HYPOTENSION, UNSPECIFIED HYPOTENSION TYPE: ICD-10-CM

## 2021-06-16 LAB
AV R PG: 42.86 MMHG
ECHO AO ASC DIAM: 3.4 CM
ECHO AO ROOT DIAM: 3.68 CM
ECHO AR MAX VEL PISA: 327.33 CM/S
ECHO AV AREA PEAK VELOCITY: 1.09 CM2
ECHO AV AREA VTI: 1.1 CM2
ECHO AV AREA/BSA PEAK VELOCITY: 0.4 CM2/M2
ECHO AV AREA/BSA VTI: 0.4 CM2/M2
ECHO AV MEAN GRADIENT: 23.02 MMHG
ECHO AV PEAK GRADIENT: 38.76 MMHG
ECHO AV PEAK VELOCITY: 309.56 CM/S
ECHO AV REGURGITANT PHT: 657.27 MS
ECHO AV VTI: 79.71 CM
ECHO LA MAJOR AXIS: 3.96 CM
ECHO LA MINOR AXIS: 1.61 CM
ECHO LA VOL 2C: 99.27 ML (ref 18–58)
ECHO LA VOL 4C: 73.16 ML (ref 18–58)
ECHO LA VOL BP: 96.32 ML (ref 18–58)
ECHO LA VOL/BSA BIPLANE: 39.15 ML/M2 (ref 16–28)
ECHO LA VOLUME INDEX A2C: 40.35 ML/M2 (ref 16–28)
ECHO LA VOLUME INDEX A4C: 29.74 ML/M2 (ref 16–28)
ECHO LV INTERNAL DIMENSION DIASTOLIC: 4.48 CM (ref 4.2–5.9)
ECHO LV INTERNAL DIMENSION SYSTOLIC: 3.06 CM
ECHO LV IVSD: 1.16 CM (ref 0.6–1)
ECHO LV MASS 2D: 194.4 G (ref 88–224)
ECHO LV MASS INDEX 2D: 79 G/M2 (ref 49–115)
ECHO LV POSTERIOR WALL DIASTOLIC: 1.22 CM (ref 0.6–1)
ECHO LVOT DIAM: 2 CM
ECHO LVOT PEAK GRADIENT: 4.98 MMHG
ECHO LVOT PEAK VELOCITY: 107.08 CM/S
ECHO LVOT SV: 87.5 ML
ECHO LVOT VTI: 27.82 CM
ECHO MV A VELOCITY: 129.02 CM/S
ECHO MV E DECELERATION TIME (DT): 279.31 MS
ECHO MV E VELOCITY: 105.74 CM/S
ECHO MV E/A RATIO: 0.82
ECHO PV PEAK INSTANTANEOUS GRADIENT SYSTOLIC: 9.12 MMHG
ECHO PV REGURGITANT MAX VELOCITY: 87.17 CM/S
ECHO RV INTERNAL DIMENSION: 4.61 CM
ECHO TV REGURGITANT MAX VELOCITY: 268.47 CM/S
ECHO TV REGURGITANT PEAK GRADIENT: 28.83 MMHG
LVOT MG: 2.81 MMHG

## 2021-06-16 PROCEDURE — 93306 TTE W/DOPPLER COMPLETE: CPT

## 2021-06-27 DIAGNOSIS — N52.9 ERECTILE DYSFUNCTION, UNSPECIFIED ERECTILE DYSFUNCTION TYPE: ICD-10-CM

## 2021-06-27 RX ORDER — SILDENAFIL 100 MG/1
TABLET, FILM COATED ORAL
Qty: 24 TABLET | Refills: 5 | Status: SHIPPED | OUTPATIENT
Start: 2021-06-27

## 2024-03-26 ENCOUNTER — OV NP (OUTPATIENT)
Dept: URBAN - METROPOLITAN AREA CLINIC 54 | Facility: CLINIC | Age: 74
End: 2024-03-26
Payer: MEDICARE

## 2024-03-26 ENCOUNTER — LAB (OUTPATIENT)
Dept: URBAN - METROPOLITAN AREA CLINIC 54 | Facility: CLINIC | Age: 74
End: 2024-03-26

## 2024-03-26 VITALS
HEIGHT: 74 IN | TEMPERATURE: 97.3 F | SYSTOLIC BLOOD PRESSURE: 120 MMHG | DIASTOLIC BLOOD PRESSURE: 70 MMHG | BODY MASS INDEX: 27.98 KG/M2 | WEIGHT: 218 LBS | HEART RATE: 56 BPM

## 2024-03-26 DIAGNOSIS — Z95.2 HEART VALVE REPLACED: ICD-10-CM

## 2024-03-26 DIAGNOSIS — D50.8 OTHER IRON DEFICIENCY ANEMIA: ICD-10-CM

## 2024-03-26 DIAGNOSIS — I48.91 ATRIAL FIBRILLATION, UNSPECIFIED TYPE: ICD-10-CM

## 2024-03-26 PROBLEM — 87522002: Status: ACTIVE | Noted: 2024-03-26

## 2024-03-26 PROBLEM — 161667004: Status: ACTIVE | Noted: 2024-03-26

## 2024-03-26 PROBLEM — 49436004: Status: ACTIVE | Noted: 2024-03-26

## 2024-03-26 PROCEDURE — 99244 OFF/OP CNSLTJ NEW/EST MOD 40: CPT | Performed by: STUDENT IN AN ORGANIZED HEALTH CARE EDUCATION/TRAINING PROGRAM

## 2024-03-26 RX ORDER — FERROUS SULFATE 325(65) MG
1 TABLET TABLET ORAL
Status: ACTIVE | COMMUNITY

## 2024-03-26 RX ORDER — SODIUM, POTASSIUM,MAG SULFATES 17.5-3.13G
177 ML SOLUTION, RECONSTITUTED, ORAL ORAL ONCE
Qty: 177 MILLILITER | Refills: 0 | OUTPATIENT
Start: 2024-03-26 | End: 2024-03-28

## 2024-03-26 RX ORDER — ALLOPURINOL 300 MG/1
1 TABLET TABLET ORAL ONCE A DAY
Status: ACTIVE | COMMUNITY

## 2024-03-26 RX ORDER — TRAMADOL HYDROCHLORIDE 50 MG/1
1 TABLET AS NEEDED TABLET, FILM COATED ORAL ONCE A DAY
Status: ACTIVE | COMMUNITY

## 2024-03-26 RX ORDER — LENALIDOMIDE 10 MG/1
1 CAPSULE CAPSULE ORAL ONCE A DAY
Status: ACTIVE | COMMUNITY

## 2024-03-26 RX ORDER — ATORVASTATIN CALCIUM 40 MG/1
1 TABLET TABLET, FILM COATED ORAL ONCE A DAY
Status: ACTIVE | COMMUNITY

## 2024-03-26 RX ORDER — GABAPENTIN 300 MG/1
1 CAPSULE CAPSULE ORAL ONCE A DAY
Status: ACTIVE | COMMUNITY

## 2024-03-26 RX ORDER — APIXABAN 2.5 MG/1
AS DIRECTED TABLET, FILM COATED ORAL
Status: ACTIVE | COMMUNITY

## 2024-03-26 RX ORDER — SOTALOL HYDROCHLORIDE 80 MG/1
1 TABLET TABLET ORAL
Status: ACTIVE | COMMUNITY

## 2024-03-26 NOTE — HPI-TODAY'S VISIT:
--3/26/2024-- Mr. Ayo Christine is a 73-year-old man with IgG kappa multiple myeloma, afib,Bovine valve replacement, drug-induced myeolosuppression referred for anemia.  Receives IV iron infusion.On Revlimid c/b myelosuppression/pancytopenia. Labs: Hgb 11.4, MCV 97.9, Plts 84 WBC 3.5  Patient was last colonoscopy was approximately 8 years ago for screening purposes.  There were no polyps found.  Patient is never had an EGD or PillCam.  Patient does have blood when wiping after defecation on occasion.

## 2024-03-26 NOTE — EXAM-PHYSICAL EXAM
General: Well appearing. No acute distress.HEENT: Anicteric scleraeCardiovascular: Normal heart rateRespiratory: Breathing comfortably without conversational dyspneaAbdomen:  non-distended, soft, non-tenderSkin: without visible rashes on examined areas.Musculoskeletal: ambulated With minor difficulty. Can stand from sitting position without assistance.Neuro: No gross focal deficits. Alert and oriented.Psych: Appropriate mood and affect.

## 2024-05-17 ENCOUNTER — OUT OF OFFICE VISIT (OUTPATIENT)
Dept: URBAN - METROPOLITAN AREA SURGERY CENTER 14 | Facility: SURGERY CENTER | Age: 74
End: 2024-05-17
Payer: MEDICARE

## 2024-05-17 ENCOUNTER — CLAIMS CREATED FROM THE CLAIM WINDOW (OUTPATIENT)
Dept: URBAN - METROPOLITAN AREA CLINIC 4 | Facility: CLINIC | Age: 74
End: 2024-05-17
Payer: MEDICARE

## 2024-05-17 DIAGNOSIS — K31.89 OTHER DISEASES OF STOMACH AND DUODENUM: ICD-10-CM

## 2024-05-17 DIAGNOSIS — K29.60 EROSIVE GASTRITIS: ICD-10-CM

## 2024-05-17 DIAGNOSIS — D50.9 IRON DEFICIENCY ANEMIA: ICD-10-CM

## 2024-05-17 DIAGNOSIS — K29.70 GASTRITIS, UNSPECIFIED, WITHOUT BLEEDING: ICD-10-CM

## 2024-05-17 DIAGNOSIS — D50.9 ANEMIA: ICD-10-CM

## 2024-05-17 DIAGNOSIS — K64.8 OTHER HEMORRHOIDS: ICD-10-CM

## 2024-05-17 PROCEDURE — 88312 SPECIAL STAINS GROUP 1: CPT | Performed by: PATHOLOGY

## 2024-05-17 PROCEDURE — 45378 DIAGNOSTIC COLONOSCOPY: CPT | Performed by: INTERNAL MEDICINE

## 2024-05-17 PROCEDURE — 88305 TISSUE EXAM BY PATHOLOGIST: CPT | Performed by: PATHOLOGY

## 2024-05-17 PROCEDURE — 43239 EGD BIOPSY SINGLE/MULTIPLE: CPT | Performed by: INTERNAL MEDICINE

## 2024-05-17 PROCEDURE — 00813 ANES UPR LWR GI NDSC PX: CPT | Performed by: NURSE ANESTHETIST, CERTIFIED REGISTERED

## 2024-05-17 PROCEDURE — G8907 PT DOC NO EVENTS ON DISCHARG: HCPCS | Performed by: INTERNAL MEDICINE

## 2024-05-17 RX ORDER — LENALIDOMIDE 10 MG/1
1 CAPSULE CAPSULE ORAL ONCE A DAY
Status: ACTIVE | COMMUNITY

## 2024-05-17 RX ORDER — ALLOPURINOL 300 MG/1
1 TABLET TABLET ORAL ONCE A DAY
Status: ACTIVE | COMMUNITY

## 2024-05-17 RX ORDER — TRAMADOL HYDROCHLORIDE 50 MG/1
1 TABLET AS NEEDED TABLET, FILM COATED ORAL ONCE A DAY
Status: ACTIVE | COMMUNITY

## 2024-05-17 RX ORDER — FERROUS SULFATE 325(65) MG
1 TABLET TABLET ORAL
Status: ACTIVE | COMMUNITY

## 2024-05-17 RX ORDER — GABAPENTIN 300 MG/1
1 CAPSULE CAPSULE ORAL ONCE A DAY
Status: ACTIVE | COMMUNITY

## 2024-05-17 RX ORDER — SOTALOL HYDROCHLORIDE 80 MG/1
1 TABLET TABLET ORAL
Status: ACTIVE | COMMUNITY

## 2024-05-17 RX ORDER — APIXABAN 2.5 MG/1
AS DIRECTED TABLET, FILM COATED ORAL
Status: ACTIVE | COMMUNITY

## 2024-05-17 RX ORDER — ATORVASTATIN CALCIUM 40 MG/1
1 TABLET TABLET, FILM COATED ORAL ONCE A DAY
Status: ACTIVE | COMMUNITY

## (undated) DEVICE — PADDING CST 6IN STERILE --

## (undated) DEVICE — STRYKER PERFORMANCE SERIES SAGITTAL BLADE: Brand: STRYKER PERFORMANCE SERIES

## (undated) DEVICE — COVER LT HNDL PLAS RIG 1 PER PK

## (undated) DEVICE — PAD,ABDOMINAL,5"X9",ST,LF,25/BX: Brand: MEDLINE INDUSTRIES, INC.

## (undated) DEVICE — INTENDED FOR TISSUE SEPARATION, AND OTHER PROCEDURES THAT REQUIRE A SHARP SURGICAL BLADE TO PUNCTURE OR CUT.: Brand: BARD-PARKER ® CARBON RIB-BACK BLADES

## (undated) DEVICE — SPONGE GZ W4XL4IN COT 12 PLY TYP VII WVN C FLD DSGN

## (undated) DEVICE — 1010 S-DRAPE TOWEL DRAPE 10/BX: Brand: STERI-DRAPE™

## (undated) DEVICE — BANDAGE COMPR W6INXL10YD ST M E WHITE/BEIGE

## (undated) DEVICE — HOOD: Brand: FLYTE

## (undated) DEVICE — STERILE POLYISOPRENE POWDER-FREE SURGICAL GLOVES WITH EMOLLIENT COATING: Brand: PROTEXIS

## (undated) DEVICE — PREP KIT PEEL PTCH POVIDONE IOD

## (undated) DEVICE — STRAP,POSITIONING,KNEE/BODY,FOAM,4X60": Brand: MEDLINE

## (undated) DEVICE — DECANTER BAG 9": Brand: MEDLINE INDUSTRIES, INC.

## (undated) DEVICE — PAD NON-ADHERENT 3X4 STRL LF --

## (undated) DEVICE — BLADE SURG SAW STD S STL OSC W/ SERR EDGE DISP

## (undated) DEVICE — TAPE,CLOTH/SILK,CURAD,3"X10YD,LF,40/CS: Brand: CURAD

## (undated) DEVICE — Device

## (undated) DEVICE — SUTURE STRATAFIX SPRL SZ 1 L14IN ABSRB VLT L48CM CTX 1/2 SXPD2B405

## (undated) DEVICE — KIT DRP FOR RIO ROBOTIC ARM ASST SYS

## (undated) DEVICE — GOWN,SIRUS,NONRNF,SETINSLV,2XL,18/CS: Brand: MEDLINE

## (undated) DEVICE — PREP SKN CHLRAPRP APL 26ML STR --

## (undated) DEVICE — SOLUTION IRRIG 3000ML 0.9% SOD CHL FLX CONT 0797208] ICU MEDICAL INC]

## (undated) DEVICE — 4-PORT MANIFOLD: Brand: NEPTUNE 2

## (undated) DEVICE — PIN BNE FIX 4X140MM STRL -- 1EA=2PK MAKO

## (undated) DEVICE — KIT LEG POS DISP -- MAKO

## (undated) DEVICE — STERILE POLYISOPRENE POWDER-FREE SURGICAL GLOVES: Brand: PROTEXIS

## (undated) DEVICE — 3M™ TEGADERM™ TRANSPARENT FILM DRESSING FRAME STYLE, 1624W, 2-3/8 IN X 2-3/4 IN (6 CM X 7 CM), 100/CT 4CT/CASE: Brand: 3M™ TEGADERM™

## (undated) DEVICE — KIT PROC KNE TRACKING PK/1 -- VIZADISC MAKO

## (undated) DEVICE — HANDPIECE SET WITH COAXIAL HIGH FLOW TIP AND SUCTION TUBE: Brand: INTERPULSE

## (undated) DEVICE — REM POLYHESIVE ADULT PATIENT RETURN ELECTRODE: Brand: VALLEYLAB

## (undated) DEVICE — SUTURE VCRL + SZ 1-0 L36IN ABSRB UD CTX 1/2 CIR TAPR PNT VCP977H

## (undated) DEVICE — DERMABOND SKIN ADH 0.7ML -- DERMABOND ADVANCED 12/BX

## (undated) DEVICE — NEEDLE HYPO 18GA L1.5IN PNK S STL HUB POLYPR SHLD REG BVL

## (undated) DEVICE — ZIMMER® STERILE DISPOSABLE TOURNIQUET CUFF WITH PROTECTIVE SLEEVE AND PLC, DUAL PORT, SINGLE BLADDER, 34 IN. (86 CM)

## (undated) DEVICE — INFECTION CONTROL KIT SYS

## (undated) DEVICE — DRAPE,EXTREMITY,89X128,STERILE: Brand: MEDLINE

## (undated) DEVICE — PIN BNE 4X110MM STRL -- 2/PK MAKO

## (undated) DEVICE — KIT INT FIX FEM TIB CKPT MAKOPLASTY

## (undated) DEVICE — SUTURE VCRL SZ 2-0 L36IN ABSRB UD L36MM CT-1 1/2 CIR J945H

## (undated) DEVICE — SYR LR LCK 1ML GRAD NSAF 30ML --

## (undated) DEVICE — SUTURE MCRYL SZ 3-0 L27IN ABSRB UD L24MM PS-1 3/8 CIR PRIM Y936H

## (undated) DEVICE — 3M™ IOBAN™ 2 ANTIMICROBIAL INCISE DRAPE 6648EZ: Brand: IOBAN™ 2

## (undated) DEVICE — SMOKE EVACUATION PENCIL: Brand: VALLEYLAB